# Patient Record
Sex: FEMALE | Race: WHITE | Employment: OTHER | URBAN - METROPOLITAN AREA
[De-identification: names, ages, dates, MRNs, and addresses within clinical notes are randomized per-mention and may not be internally consistent; named-entity substitution may affect disease eponyms.]

---

## 2024-08-12 ENCOUNTER — APPOINTMENT (OUTPATIENT)
Facility: HOSPITAL | Age: 74
DRG: 291 | End: 2024-08-12
Attending: FAMILY MEDICINE
Payer: OTHER GOVERNMENT

## 2024-08-12 ENCOUNTER — APPOINTMENT (OUTPATIENT)
Facility: HOSPITAL | Age: 74
End: 2024-08-12
Payer: OTHER GOVERNMENT

## 2024-08-12 ENCOUNTER — APPOINTMENT (OUTPATIENT)
Facility: HOSPITAL | Age: 74
DRG: 291 | End: 2024-08-12
Payer: OTHER GOVERNMENT

## 2024-08-12 ENCOUNTER — HOSPITAL ENCOUNTER (EMERGENCY)
Facility: HOSPITAL | Age: 74
Discharge: ANOTHER HOSPITAL | End: 2024-08-12
Attending: EMERGENCY MEDICINE
Payer: OTHER GOVERNMENT

## 2024-08-12 ENCOUNTER — HOSPITAL ENCOUNTER (INPATIENT)
Facility: HOSPITAL | Age: 74
LOS: 7 days | Discharge: NURSING, REHAB OR LONG-TERM CARE | DRG: 291 | End: 2024-08-19
Attending: STUDENT IN AN ORGANIZED HEALTH CARE EDUCATION/TRAINING PROGRAM | Admitting: FAMILY MEDICINE
Payer: OTHER GOVERNMENT

## 2024-08-12 VITALS
SYSTOLIC BLOOD PRESSURE: 135 MMHG | HEIGHT: 61 IN | RESPIRATION RATE: 18 BRPM | DIASTOLIC BLOOD PRESSURE: 69 MMHG | OXYGEN SATURATION: 97 % | WEIGHT: 231.1 LBS | TEMPERATURE: 97.6 F | HEART RATE: 70 BPM | BODY MASS INDEX: 43.63 KG/M2

## 2024-08-12 DIAGNOSIS — R07.9 CHEST PAIN, UNSPECIFIED TYPE: ICD-10-CM

## 2024-08-12 DIAGNOSIS — I50.9 ACUTE ON CHRONIC CONGESTIVE HEART FAILURE, UNSPECIFIED HEART FAILURE TYPE (HCC): Primary | ICD-10-CM

## 2024-08-12 DIAGNOSIS — R09.02 HYPOXEMIA: ICD-10-CM

## 2024-08-12 DIAGNOSIS — R07.9 CHEST PAIN, UNSPECIFIED TYPE: Primary | ICD-10-CM

## 2024-08-12 PROBLEM — I50.43 CHF (CONGESTIVE HEART FAILURE), NYHA CLASS I, ACUTE ON CHRONIC, COMBINED (HCC): Status: ACTIVE | Noted: 2024-08-12

## 2024-08-12 LAB
ALBUMIN SERPL-MCNC: 1.8 G/DL (ref 3.5–5)
ALBUMIN/GLOB SERPL: 0.5 (ref 1.1–2.2)
ALP SERPL-CCNC: 150 U/L (ref 45–117)
ALT SERPL-CCNC: 16 U/L (ref 12–78)
ANION GAP SERPL CALC-SCNC: 5 MMOL/L (ref 5–15)
AST SERPL W P-5'-P-CCNC: 32 U/L (ref 15–37)
BASOPHILS # BLD: 0.1 K/UL (ref 0–0.1)
BASOPHILS NFR BLD: 1 % (ref 0–1)
BILIRUB SERPL-MCNC: 0.6 MG/DL (ref 0.2–1)
BNP SERPL-MCNC: 2395 PG/ML
BUN SERPL-MCNC: 22 MG/DL (ref 6–20)
BUN/CREAT SERPL: 22 (ref 12–20)
CA-I BLD-MCNC: 9.4 MG/DL (ref 8.5–10.1)
CHLORIDE SERPL-SCNC: 101 MMOL/L (ref 97–108)
CO2 SERPL-SCNC: 32 MMOL/L (ref 21–32)
CREAT SERPL-MCNC: 0.98 MG/DL (ref 0.55–1.02)
DIFFERENTIAL METHOD BLD: ABNORMAL
EKG ATRIAL RATE: 0 BPM
EKG ATRIAL RATE: 62 BPM
EKG DIAGNOSIS: NORMAL
EKG DIAGNOSIS: NORMAL
EKG P-R INTERVAL: 187 MS
EKG Q-T INTERVAL: 425 MS
EKG Q-T INTERVAL: 464 MS
EKG QRS DURATION: 148 MS
EKG QRS DURATION: 150 MS
EKG QTC CALCULATION (BAZETT): 459 MS
EKG QTC CALCULATION (BAZETT): 501 MS
EKG R AXIS: 113 DEGREES
EKG R AXIS: 35 DEGREES
EKG T AXIS: 20 DEGREES
EKG VENTRICULAR RATE: 70 BPM
EKG VENTRICULAR RATE: 70 BPM
EOSINOPHIL # BLD: 0.3 K/UL (ref 0–0.4)
EOSINOPHIL NFR BLD: 3 % (ref 0–7)
ERYTHROCYTE [DISTWIDTH] IN BLOOD BY AUTOMATED COUNT: 15.6 % (ref 11.5–14.5)
EST. AVERAGE GLUCOSE BLD GHB EST-MCNC: 209 MG/DL
GLOBULIN SER CALC-MCNC: 4 G/DL (ref 2–4)
GLUCOSE BLD STRIP.AUTO-MCNC: 175 MG/DL (ref 65–100)
GLUCOSE BLD STRIP.AUTO-MCNC: 216 MG/DL (ref 65–100)
GLUCOSE BLD STRIP.AUTO-MCNC: 284 MG/DL (ref 65–100)
GLUCOSE SERPL-MCNC: 198 MG/DL (ref 65–100)
HBA1C MFR BLD: 8.9 % (ref 4–5.6)
HCT VFR BLD AUTO: 40.5 % (ref 35–47)
HGB BLD-MCNC: 12.8 G/DL (ref 11.5–16)
IMM GRANULOCYTES # BLD AUTO: 0.2 K/UL (ref 0–0.04)
IMM GRANULOCYTES NFR BLD AUTO: 2 % (ref 0–0.5)
INR PPP: 1.2 (ref 0.9–1.1)
LYMPHOCYTES # BLD: 0.9 K/UL (ref 0.8–3.5)
LYMPHOCYTES NFR BLD: 8 % (ref 12–49)
MCH RBC QN AUTO: 30.6 PG (ref 26–34)
MCHC RBC AUTO-ENTMCNC: 31.6 G/DL (ref 30–36.5)
MCV RBC AUTO: 96.9 FL (ref 80–99)
MONOCYTES # BLD: 0.8 K/UL (ref 0–1)
MONOCYTES NFR BLD: 7 % (ref 5–13)
NEUTS SEG # BLD: 9.3 K/UL (ref 1.8–8)
NEUTS SEG NFR BLD: 79 % (ref 32–75)
NRBC # BLD: 0 K/UL (ref 0–0.01)
NRBC BLD-RTO: 0 PER 100 WBC
PERFORMED BY:: ABNORMAL
PLATELET # BLD AUTO: 289 K/UL (ref 150–400)
PMV BLD AUTO: 8.8 FL (ref 8.9–12.9)
POTASSIUM SERPL-SCNC: 4.7 MMOL/L (ref 3.5–5.1)
PROT SERPL-MCNC: 5.8 G/DL (ref 6.4–8.2)
PROTHROMBIN TIME: 15.8 SEC (ref 11.9–14.6)
RBC # BLD AUTO: 4.18 M/UL (ref 3.8–5.2)
RBC MORPH BLD: ABNORMAL
SODIUM SERPL-SCNC: 138 MMOL/L (ref 136–145)
TROPONIN I SERPL HS-MCNC: 5 NG/L (ref 0–51)
TROPONIN I SERPL HS-MCNC: 6 NG/L (ref 0–51)
TROPONIN I SERPL HS-MCNC: 6 NG/L (ref 0–51)
WBC # BLD AUTO: 11.6 K/UL (ref 3.6–11)

## 2024-08-12 PROCEDURE — 82962 GLUCOSE BLOOD TEST: CPT

## 2024-08-12 PROCEDURE — 2700000000 HC OXYGEN THERAPY PER DAY

## 2024-08-12 PROCEDURE — 93005 ELECTROCARDIOGRAM TRACING: CPT | Performed by: STUDENT IN AN ORGANIZED HEALTH CARE EDUCATION/TRAINING PROGRAM

## 2024-08-12 PROCEDURE — 71045 X-RAY EXAM CHEST 1 VIEW: CPT

## 2024-08-12 PROCEDURE — 80053 COMPREHEN METABOLIC PANEL: CPT

## 2024-08-12 PROCEDURE — 73610 X-RAY EXAM OF ANKLE: CPT

## 2024-08-12 PROCEDURE — 2060000000 HC ICU INTERMEDIATE R&B

## 2024-08-12 PROCEDURE — 6370000000 HC RX 637 (ALT 250 FOR IP): Performed by: EMERGENCY MEDICINE

## 2024-08-12 PROCEDURE — 2580000003 HC RX 258: Performed by: FAMILY MEDICINE

## 2024-08-12 PROCEDURE — 6360000002 HC RX W HCPCS: Performed by: EMERGENCY MEDICINE

## 2024-08-12 PROCEDURE — 6370000000 HC RX 637 (ALT 250 FOR IP): Performed by: FAMILY MEDICINE

## 2024-08-12 PROCEDURE — 6360000004 HC RX CONTRAST MEDICATION

## 2024-08-12 PROCEDURE — 84484 ASSAY OF TROPONIN QUANT: CPT

## 2024-08-12 PROCEDURE — 85610 PROTHROMBIN TIME: CPT

## 2024-08-12 PROCEDURE — 96374 THER/PROPH/DIAG INJ IV PUSH: CPT

## 2024-08-12 PROCEDURE — 36415 COLL VENOUS BLD VENIPUNCTURE: CPT

## 2024-08-12 PROCEDURE — 83036 HEMOGLOBIN GLYCOSYLATED A1C: CPT

## 2024-08-12 PROCEDURE — 71275 CT ANGIOGRAPHY CHEST: CPT

## 2024-08-12 PROCEDURE — 96375 TX/PRO/DX INJ NEW DRUG ADDON: CPT

## 2024-08-12 PROCEDURE — 36410 VNPNXR 3YR/> PHY/QHP DX/THER: CPT

## 2024-08-12 PROCEDURE — 99285 EMERGENCY DEPT VISIT HI MDM: CPT

## 2024-08-12 PROCEDURE — C8929 TTE W OR WO FOL WCON,DOPPLER: HCPCS

## 2024-08-12 PROCEDURE — 6360000002 HC RX W HCPCS: Performed by: FAMILY MEDICINE

## 2024-08-12 PROCEDURE — 85025 COMPLETE CBC W/AUTO DIFF WBC: CPT

## 2024-08-12 PROCEDURE — 93005 ELECTROCARDIOGRAM TRACING: CPT | Performed by: EMERGENCY MEDICINE

## 2024-08-12 PROCEDURE — 6370000000 HC RX 637 (ALT 250 FOR IP)

## 2024-08-12 PROCEDURE — 6360000004 HC RX CONTRAST MEDICATION: Performed by: STUDENT IN AN ORGANIZED HEALTH CARE EDUCATION/TRAINING PROGRAM

## 2024-08-12 PROCEDURE — 94761 N-INVAS EAR/PLS OXIMETRY MLT: CPT

## 2024-08-12 PROCEDURE — 83880 ASSAY OF NATRIURETIC PEPTIDE: CPT

## 2024-08-12 PROCEDURE — 05HD33Z INSERTION OF INFUSION DEVICE INTO RIGHT CEPHALIC VEIN, PERCUTANEOUS APPROACH: ICD-10-PCS | Performed by: FAMILY MEDICINE

## 2024-08-12 RX ORDER — EXENATIDE 2 MG/.85ML
2 INJECTION, SUSPENSION, EXTENDED RELEASE SUBCUTANEOUS
Status: ON HOLD | COMMUNITY
End: 2024-08-19 | Stop reason: HOSPADM

## 2024-08-12 RX ORDER — ACETAMINOPHEN 160 MG
1 TABLET,DISINTEGRATING ORAL DAILY
Status: ON HOLD | COMMUNITY
End: 2024-08-19 | Stop reason: HOSPADM

## 2024-08-12 RX ORDER — BUMETANIDE 0.25 MG/ML
1 INJECTION INTRAMUSCULAR; INTRAVENOUS DAILY
Status: DISCONTINUED | OUTPATIENT
Start: 2024-08-12 | End: 2024-08-14

## 2024-08-12 RX ORDER — POTASSIUM CHLORIDE 750 MG/1
10 TABLET, FILM COATED, EXTENDED RELEASE ORAL DAILY
Status: DISCONTINUED | OUTPATIENT
Start: 2024-08-12 | End: 2024-08-19 | Stop reason: HOSPADM

## 2024-08-12 RX ORDER — INSULIN LISPRO 100 [IU]/ML
0-16 INJECTION, SOLUTION INTRAVENOUS; SUBCUTANEOUS
Status: DISCONTINUED | OUTPATIENT
Start: 2024-08-12 | End: 2024-08-19 | Stop reason: HOSPADM

## 2024-08-12 RX ORDER — WARFARIN SODIUM 2 MG/1
2 TABLET ORAL
Status: ON HOLD | COMMUNITY
End: 2024-08-19 | Stop reason: HOSPADM

## 2024-08-12 RX ORDER — EZETIMIBE 10 MG/1
10 TABLET ORAL NIGHTLY
Status: DISCONTINUED | OUTPATIENT
Start: 2024-08-12 | End: 2024-08-19 | Stop reason: HOSPADM

## 2024-08-12 RX ORDER — SODIUM CHLORIDE 9 MG/ML
INJECTION, SOLUTION INTRAVENOUS PRN
Status: DISCONTINUED | OUTPATIENT
Start: 2024-08-12 | End: 2024-08-19 | Stop reason: HOSPADM

## 2024-08-12 RX ORDER — ATORVASTATIN CALCIUM 80 MG/1
80 TABLET, FILM COATED ORAL NIGHTLY
COMMUNITY

## 2024-08-12 RX ORDER — TRAMADOL HYDROCHLORIDE 50 MG/1
50 TABLET ORAL EVERY 6 HOURS PRN
Status: DISCONTINUED | OUTPATIENT
Start: 2024-08-12 | End: 2024-08-19 | Stop reason: HOSPADM

## 2024-08-12 RX ORDER — POTASSIUM CHLORIDE 20 MEQ/1
40 TABLET, EXTENDED RELEASE ORAL PRN
Status: DISCONTINUED | OUTPATIENT
Start: 2024-08-12 | End: 2024-08-19 | Stop reason: HOSPADM

## 2024-08-12 RX ORDER — ONDANSETRON 2 MG/ML
4 INJECTION INTRAMUSCULAR; INTRAVENOUS EVERY 6 HOURS PRN
Status: DISCONTINUED | OUTPATIENT
Start: 2024-08-12 | End: 2024-08-19 | Stop reason: HOSPADM

## 2024-08-12 RX ORDER — DIGOXIN 125 MCG
125 TABLET ORAL DAILY
Status: DISCONTINUED | OUTPATIENT
Start: 2024-08-12 | End: 2024-08-19 | Stop reason: HOSPADM

## 2024-08-12 RX ORDER — INSULIN LISPRO 100 [IU]/ML
24 INJECTION, SOLUTION INTRAVENOUS; SUBCUTANEOUS
COMMUNITY

## 2024-08-12 RX ORDER — INSULIN GLARGINE 100 [IU]/ML
30 INJECTION, SOLUTION SUBCUTANEOUS EVERY MORNING
COMMUNITY

## 2024-08-12 RX ORDER — MAGNESIUM HYDROXIDE/ALUMINUM HYDROXICE/SIMETHICONE 120; 1200; 1200 MG/30ML; MG/30ML; MG/30ML
30 SUSPENSION ORAL EVERY 6 HOURS PRN
Status: DISCONTINUED | OUTPATIENT
Start: 2024-08-12 | End: 2024-08-12 | Stop reason: HOSPADM

## 2024-08-12 RX ORDER — LETROZOLE 2.5 MG/1
2.5 TABLET, FILM COATED ORAL DAILY
Status: DISCONTINUED | OUTPATIENT
Start: 2024-08-12 | End: 2024-08-19 | Stop reason: HOSPADM

## 2024-08-12 RX ORDER — METOPROLOL TARTRATE 50 MG/1
50 TABLET, FILM COATED ORAL 2 TIMES DAILY
COMMUNITY

## 2024-08-12 RX ORDER — DEXTROSE MONOHYDRATE 100 MG/ML
INJECTION, SOLUTION INTRAVENOUS CONTINUOUS PRN
Status: DISCONTINUED | OUTPATIENT
Start: 2024-08-12 | End: 2024-08-19 | Stop reason: HOSPADM

## 2024-08-12 RX ORDER — MAGNESIUM SULFATE IN WATER 40 MG/ML
2000 INJECTION, SOLUTION INTRAVENOUS PRN
Status: DISCONTINUED | OUTPATIENT
Start: 2024-08-12 | End: 2024-08-19 | Stop reason: HOSPADM

## 2024-08-12 RX ORDER — DOCUSATE SODIUM 100 MG/1
100 CAPSULE, LIQUID FILLED ORAL DAILY
Status: ON HOLD | COMMUNITY
End: 2024-08-19 | Stop reason: HOSPADM

## 2024-08-12 RX ORDER — GLUCAGON 1 MG/ML
1 KIT INJECTION PRN
Status: DISCONTINUED | OUTPATIENT
Start: 2024-08-12 | End: 2024-08-19 | Stop reason: HOSPADM

## 2024-08-12 RX ORDER — ALLOPURINOL 200 MG/1
200 TABLET ORAL DAILY
Status: ON HOLD | COMMUNITY
End: 2024-08-19 | Stop reason: HOSPADM

## 2024-08-12 RX ORDER — SODIUM CHLORIDE 0.9 % (FLUSH) 0.9 %
5-40 SYRINGE (ML) INJECTION PRN
Status: DISCONTINUED | OUTPATIENT
Start: 2024-08-12 | End: 2024-08-19 | Stop reason: HOSPADM

## 2024-08-12 RX ORDER — MORPHINE SULFATE 4 MG/ML
4 INJECTION, SOLUTION INTRAMUSCULAR; INTRAVENOUS
Status: COMPLETED | OUTPATIENT
Start: 2024-08-12 | End: 2024-08-12

## 2024-08-12 RX ORDER — SENNOSIDES A AND B 8.6 MG/1
2 TABLET, FILM COATED ORAL
Status: ON HOLD | COMMUNITY
End: 2024-08-19 | Stop reason: HOSPADM

## 2024-08-12 RX ORDER — LETROZOLE 2.5 MG/1
2.5 TABLET, FILM COATED ORAL DAILY
COMMUNITY

## 2024-08-12 RX ORDER — SEMAGLUTIDE 2.68 MG/ML
2 INJECTION, SOLUTION SUBCUTANEOUS
COMMUNITY

## 2024-08-12 RX ORDER — EZETIMIBE 10 MG/1
5 TABLET ORAL NIGHTLY
COMMUNITY

## 2024-08-12 RX ORDER — ACETAMINOPHEN 500 MG
1000 TABLET ORAL EVERY 8 HOURS SCHEDULED
Status: DISCONTINUED | OUTPATIENT
Start: 2024-08-12 | End: 2024-08-19 | Stop reason: HOSPADM

## 2024-08-12 RX ORDER — LIDOCAINE HYDROCHLORIDE 20 MG/ML
15 SOLUTION OROPHARYNGEAL
Status: DISCONTINUED | OUTPATIENT
Start: 2024-08-12 | End: 2024-08-12 | Stop reason: HOSPADM

## 2024-08-12 RX ORDER — WARFARIN SODIUM 3 MG/1
3 TABLET ORAL
Status: ON HOLD | COMMUNITY
End: 2024-08-19 | Stop reason: HOSPADM

## 2024-08-12 RX ORDER — ENOXAPARIN SODIUM 100 MG/ML
0.4 INJECTION SUBCUTANEOUS 2 TIMES DAILY
Status: ON HOLD | COMMUNITY
End: 2024-08-19 | Stop reason: HOSPADM

## 2024-08-12 RX ORDER — TRAMADOL HYDROCHLORIDE 50 MG/1
50 TABLET ORAL EVERY 6 HOURS PRN
COMMUNITY

## 2024-08-12 RX ORDER — BENZOCAINE/MENTHOL 6 MG-10 MG
LOZENGE MUCOUS MEMBRANE EVERY MORNING
COMMUNITY

## 2024-08-12 RX ORDER — LATANOPROST 50 UG/ML
1 SOLUTION/ DROPS OPHTHALMIC NIGHTLY
COMMUNITY

## 2024-08-12 RX ORDER — ACETAMINOPHEN 500 MG
1000 TABLET ORAL EVERY 8 HOURS PRN
COMMUNITY

## 2024-08-12 RX ORDER — POTASSIUM CHLORIDE 7.45 MG/ML
10 INJECTION INTRAVENOUS PRN
Status: DISCONTINUED | OUTPATIENT
Start: 2024-08-12 | End: 2024-08-19 | Stop reason: HOSPADM

## 2024-08-12 RX ORDER — SODIUM CHLORIDE 0.9 % (FLUSH) 0.9 %
5-40 SYRINGE (ML) INJECTION EVERY 12 HOURS SCHEDULED
Status: DISCONTINUED | OUTPATIENT
Start: 2024-08-12 | End: 2024-08-19 | Stop reason: HOSPADM

## 2024-08-12 RX ORDER — ATORVASTATIN CALCIUM 40 MG/1
40 TABLET, FILM COATED ORAL NIGHTLY
Status: DISCONTINUED | OUTPATIENT
Start: 2024-08-12 | End: 2024-08-19 | Stop reason: HOSPADM

## 2024-08-12 RX ORDER — ONDANSETRON 4 MG/1
4 TABLET, ORALLY DISINTEGRATING ORAL EVERY 8 HOURS PRN
Status: DISCONTINUED | OUTPATIENT
Start: 2024-08-12 | End: 2024-08-19 | Stop reason: HOSPADM

## 2024-08-12 RX ORDER — DILTIAZEM HYDROCHLORIDE 300 MG/1
300 CAPSULE, COATED, EXTENDED RELEASE ORAL DAILY
Status: DISCONTINUED | OUTPATIENT
Start: 2024-08-12 | End: 2024-08-19 | Stop reason: HOSPADM

## 2024-08-12 RX ORDER — ACETAMINOPHEN 325 MG/1
650 TABLET ORAL EVERY 6 HOURS PRN
Status: DISCONTINUED | OUTPATIENT
Start: 2024-08-12 | End: 2024-08-12

## 2024-08-12 RX ORDER — CALCIUM POLYCARBOPHIL 625 MG
625 TABLET ORAL
Status: ON HOLD | COMMUNITY
End: 2024-08-19 | Stop reason: HOSPADM

## 2024-08-12 RX ORDER — POLYETHYLENE GLYCOL 3350 17 G/17G
17 POWDER, FOR SOLUTION ORAL DAILY PRN
Status: DISCONTINUED | OUTPATIENT
Start: 2024-08-12 | End: 2024-08-19 | Stop reason: HOSPADM

## 2024-08-12 RX ORDER — ACETAMINOPHEN 650 MG/1
650 SUPPOSITORY RECTAL EVERY 6 HOURS PRN
Status: DISCONTINUED | OUTPATIENT
Start: 2024-08-12 | End: 2024-08-12

## 2024-08-12 RX ORDER — FUROSEMIDE 10 MG/ML
40 INJECTION INTRAMUSCULAR; INTRAVENOUS
Status: COMPLETED | OUTPATIENT
Start: 2024-08-12 | End: 2024-08-12

## 2024-08-12 RX ORDER — DILTIAZEM HYDROCHLORIDE EXTENDED-RELEASE TABLETS 300 MG/1
1 TABLET, EXTENDED RELEASE ORAL DAILY
COMMUNITY

## 2024-08-12 RX ORDER — DIGOXIN 125 MCG
300 TABLET ORAL DAILY
COMMUNITY

## 2024-08-12 RX ORDER — INSULIN LISPRO 100 [IU]/ML
0-4 INJECTION, SOLUTION INTRAVENOUS; SUBCUTANEOUS NIGHTLY
Status: DISCONTINUED | OUTPATIENT
Start: 2024-08-12 | End: 2024-08-19 | Stop reason: HOSPADM

## 2024-08-12 RX ORDER — CLOBETASOL PROPIONATE 0.5 MG/G
CREAM TOPICAL
Status: ON HOLD | COMMUNITY
End: 2024-08-19 | Stop reason: HOSPADM

## 2024-08-12 RX ORDER — POLYETHYLENE GLYCOL 3350 17 G/17G
17 POWDER, FOR SOLUTION ORAL 2 TIMES DAILY
COMMUNITY

## 2024-08-12 RX ADMIN — LETROZOLE 2.5 MG: 2.5 TABLET ORAL at 12:57

## 2024-08-12 RX ADMIN — ATORVASTATIN CALCIUM 40 MG: 40 TABLET, FILM COATED ORAL at 22:42

## 2024-08-12 RX ADMIN — POTASSIUM CHLORIDE 10 MEQ: 750 TABLET, EXTENDED RELEASE ORAL at 11:59

## 2024-08-12 RX ADMIN — METOPROLOL TARTRATE 50 MG: 50 TABLET, FILM COATED ORAL at 11:59

## 2024-08-12 RX ADMIN — PERFLUTREN 1 ML: 6.52 INJECTION, SUSPENSION INTRAVENOUS at 17:56

## 2024-08-12 RX ADMIN — IOPAMIDOL 100 ML: 755 INJECTION, SOLUTION INTRAVENOUS at 09:51

## 2024-08-12 RX ADMIN — SODIUM CHLORIDE, PRESERVATIVE FREE 10 ML: 5 INJECTION INTRAVENOUS at 12:58

## 2024-08-12 RX ADMIN — EZETIMIBE 10 MG: 10 TABLET ORAL at 22:43

## 2024-08-12 RX ADMIN — INSULIN HUMAN 24 UNITS: 100 INJECTION, SUSPENSION SUBCUTANEOUS at 12:57

## 2024-08-12 RX ADMIN — DILTIAZEM HYDROCHLORIDE 300 MG: 300 CAPSULE, COATED, EXTENDED RELEASE ORAL at 11:59

## 2024-08-12 RX ADMIN — SODIUM CHLORIDE, PRESERVATIVE FREE 10 ML: 5 INJECTION INTRAVENOUS at 22:43

## 2024-08-12 RX ADMIN — WARFARIN SODIUM 3 MG: 2 TABLET ORAL at 18:21

## 2024-08-12 RX ADMIN — ACETAMINOPHEN 1000 MG: 500 TABLET ORAL at 15:38

## 2024-08-12 RX ADMIN — METOPROLOL TARTRATE 50 MG: 50 TABLET, FILM COATED ORAL at 22:42

## 2024-08-12 RX ADMIN — DIGOXIN 125 MCG: 0.25 TABLET ORAL at 12:57

## 2024-08-12 RX ADMIN — ACETAMINOPHEN 1000 MG: 500 TABLET ORAL at 22:42

## 2024-08-12 RX ADMIN — INSULIN LISPRO 4 UNITS: 100 INJECTION, SOLUTION INTRAVENOUS; SUBCUTANEOUS at 18:21

## 2024-08-12 RX ADMIN — FUROSEMIDE 40 MG: 10 INJECTION, SOLUTION INTRAMUSCULAR; INTRAVENOUS at 06:29

## 2024-08-12 RX ADMIN — MORPHINE SULFATE 4 MG: 4 INJECTION, SOLUTION INTRAMUSCULAR; INTRAVENOUS at 06:29

## 2024-08-12 ASSESSMENT — PAIN - FUNCTIONAL ASSESSMENT
PAIN_FUNCTIONAL_ASSESSMENT: 0-10
PAIN_FUNCTIONAL_ASSESSMENT: 0-10

## 2024-08-12 ASSESSMENT — PAIN SCALES - GENERAL
PAINLEVEL_OUTOF10: 10
PAINLEVEL_OUTOF10: 9
PAINLEVEL_OUTOF10: 10
PAINLEVEL_OUTOF10: 10

## 2024-08-12 ASSESSMENT — ENCOUNTER SYMPTOMS
GASTROINTESTINAL NEGATIVE: 1
SHORTNESS OF BREATH: 1

## 2024-08-12 ASSESSMENT — PAIN DESCRIPTION - LOCATION
LOCATION: CHEST

## 2024-08-12 ASSESSMENT — LIFESTYLE VARIABLES
HOW OFTEN DO YOU HAVE A DRINK CONTAINING ALCOHOL: NEVER
HOW MANY STANDARD DRINKS CONTAINING ALCOHOL DO YOU HAVE ON A TYPICAL DAY: PATIENT DOES NOT DRINK

## 2024-08-12 NOTE — ED PROVIDER NOTES
[facility] EMERGENCY DEPT  EMERGENCY DEPARTMENT HISTORY AND PHYSICAL EXAM      Date: 8/12/2024  Patient Name: Ary Munoz  MRN: [id number]  Birthdate 1950  Date of evaluation: 8/12/2024  Provider: Terrell Lombardi MD   Note Started: 10:41 AM EDT 8/12/24    HISTORY OF PRESENT ILLNESS     Chief Complaint   Patient presents with   • Chest Pain       History Provided By: Patient    HPI: Ary Munoz is a 74 y.o. female with past medical history as reviewed below presents for evaluation of chest pain and dyspnea.  Symptoms present for last 24 hours.  Patient history notable for lower extremity orthopedic surgery.  Patient initially seen at an outside hospital where concern for PE and inability to perform CT scan to transfer here.  Patient received Lasix prior to transfer    PAST MEDICAL HISTORY   Past Medical History:  Past Medical History:   Diagnosis Date   • Acute deep vein thrombosis (DVT) of right lower extremity (HCC)    • Cataract     bilateral   • Cervical radiculopathy    • Chronic diastolic congestive heart failure (HCC)    • Chronic pain syndrome    • Displaced trimalleolar fracture of right lower leg, sequela    • Encounter for other orthopedic aftercare    • Hyperlipidemia, unspecified    • Idiopathic chronic gout, unspecified site, with tophus (tophi)    • Insomnia    • Low back pain    • Night sweats    • Obstructive sleep apnea (adult) (pediatric)    • Other retention of urine    • Pain of left hip joint    • Recurrent UTI    • Sick sinus syndrome (HCC)    • Syncope    • Thyroid nodule    • Tinnitus    • Tubular adenoma of colon    • Type 2 diabetes mellitus with unspecified complications ([facility])    • Unspecified atrial fibrillation (HCC)    • Unspecified atrioventricular block    • 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

## 2024-08-12 NOTE — ED TRIAGE NOTES
Pt started to have chest pain last night but went to sleep hoping it would go away but the pain worsened to 10/10 starts in the center and radiates to the right.    Pt is from [facility] and there for rehab from an ortho surgery. Pt got Lovenox three hours PTA.

## 2024-08-12 NOTE — ED NOTES
[facility] EMERGENCY DEPT  EMERGENCY DEPARTMENT ENCOUNTER      Pt Name: Ary Munoz  MRN: [id number]  Birthdate 1950  Date of evaluation: 8/12/2024  Provider: Gianni Mccartney MD  3:33 AM    CHIEF COMPLAINT       Chief Complaint   Patient presents with    Chest Pain         HISTORY OF PRESENT ILLNESS    Ary Munoz is a 74 y.o. female with history of diabetes, A-fib, CHF and breast cancer, who presents to the emergency department with complaint of chest pain that started tonight.  Pain started substernally but radiates to the right.  She reports shortness of breath.  She is satting at 97%.  She reports continuous O2 supplement this week.    HPI    Nursing Notes were reviewed.    REVIEW OF SYSTEMS       Review of Systems   Constitutional: Negative.    HENT: Negative.     Respiratory:  Positive for shortness of breath.    Cardiovascular:  Positive for chest pain.   Gastrointestinal: Negative.    Neurological: Negative.    Hematological: Negative.        Except as noted above the remainder of the review of systems was reviewed and negative.       PAST MEDICAL HISTORY   No past medical history on file.      SURGICAL HISTORY     No past surgical history on file.      CURRENT MEDICATIONS       Previous Medications    No medications on file       ALLERGIES     Sulfa antibiotics and Amiodarone    FAMILY HISTORY     No family history on file.       SOCIAL HISTORY       Social History     Socioeconomic History    Marital status:        SCREENINGS         Jorge Coma Scale  Eye Opening: Spontaneous  Best Verbal Response: Oriented  Best Motor Response: Obeys commands  Jorge Coma Scale Score: 15                                       PHYSICAL EXAM       ED Triage Vitals   BP Systolic BP Percentile Diastolic BP Percentile Temp Temp src Pulse Resp SpO2   -- -- -- -- -- -- -- --      Height Weight         -- --             Physical Exam  Vitals and nursing note reviewed.   Constitutional:       General: She is not in acute

## 2024-08-12 NOTE — ED NOTES
Pt returned from CT hypoxic, so I increased her oxygen until she returned to the 90s. Pt is currently back on 3L and is at 92%.

## 2024-08-12 NOTE — ED TRIAGE NOTES
Pt is a transfer from [facility] for a CTA. Pt reports chest pain is still a 9/10.

## 2024-08-12 NOTE — ED NOTES
ED TO INPATIENT SBAR HANDOFF    Patient Name: Ary Munoz   Preferred Name: Ary  : 1950  74 y.o.   Family/Caregiver Present: no   Code Status Order: Full Code  PO Status: NPO:No  Telemetry Order:   C-SSRS: Risk of Suicide: No Risk  Sitter no   Restraints:     Sepsis Risk Score      Situation  Chief Complaint   Patient presents with    Chest Pain     Brief Description of Patient's Condition: Patient is a 74 y.o. year old female past medical history of acute deep vein thrombosis of right lower extremity, obstructive sleep apnea, type 2 diabetes, hyperlipidemia, chronic diastolic congestive heart failure, unspecified type of carcinoma in situ of left breast, A-fib, displaced trimalleolar fracture right lower leg, recurrent UTI presents to the ED with complaint of chest pain.  Pain started substernally but radiates to the right.  She reports shortness of breath.   Mental Status: oriented, alert, coherent, logical, thought processes intact, and able to concentrate and follow conversation  Arrived from:Home  Imaging:   CTA CHEST W WO CONTRAST PE Eval   Final Result   No pulmonary embolus. Bilateral pleural effusions, greater on the   left.            Electronically signed by Mayito Parish        Abnormal labs: Abnormal Labs Reviewed - No abnormal labs to display    Background  Allergies:   Allergies   Allergen Reactions    Sulfa Antibiotics Hives    Amiodarone Other (See Comments)     History:   Past Medical History:   Diagnosis Date    Acute deep vein thrombosis (DVT) of right lower extremity (HCC)     Cataract     bilateral    Cervical radiculopathy     Chronic diastolic congestive heart failure (HCC)     Chronic pain syndrome     Displaced trimalleolar fracture of right lower leg, sequela     Encounter for other orthopedic aftercare     Hyperlipidemia, unspecified     Idiopathic chronic gout, unspecified site, with tophus (tophi)     Insomnia     Low back pain     Night sweats     Obstructive sleep apnea

## 2024-08-13 ENCOUNTER — APPOINTMENT (OUTPATIENT)
Facility: HOSPITAL | Age: 74
DRG: 291 | End: 2024-08-13
Payer: OTHER GOVERNMENT

## 2024-08-13 LAB
ALBUMIN SERPL-MCNC: 2.1 G/DL (ref 3.5–5)
ALBUMIN/GLOB SERPL: 0.6 (ref 1.1–2.2)
ALP SERPL-CCNC: 136 U/L (ref 45–117)
ALT SERPL-CCNC: 18 U/L (ref 12–78)
ANION GAP SERPL CALC-SCNC: 5 MMOL/L (ref 5–15)
AST SERPL W P-5'-P-CCNC: 20 U/L (ref 15–37)
BASOPHILS # BLD: 0 K/UL (ref 0–0.1)
BASOPHILS NFR BLD: 0 % (ref 0–1)
BILIRUB SERPL-MCNC: 0.7 MG/DL (ref 0.2–1)
BNP SERPL-MCNC: 2683 PG/ML
BUN SERPL-MCNC: 23 MG/DL (ref 6–20)
BUN/CREAT SERPL: 18 (ref 12–20)
CA-I BLD-MCNC: 9.5 MG/DL (ref 8.5–10.1)
CHLORIDE SERPL-SCNC: 98 MMOL/L (ref 97–108)
CO2 SERPL-SCNC: 34 MMOL/L (ref 21–32)
CREAT SERPL-MCNC: 1.26 MG/DL (ref 0.55–1.02)
DIFFERENTIAL METHOD BLD: ABNORMAL
ECHO AV AREA PEAK VELOCITY: 1.3 CM2
ECHO AV AREA VTI: 1.4 CM2
ECHO AV AREA/BSA PEAK VELOCITY: 0.6 CM2/M2
ECHO AV AREA/BSA VTI: 0.7 CM2/M2
ECHO AV MEAN GRADIENT: 3 MMHG
ECHO AV MEAN VELOCITY: 0.8 M/S
ECHO AV PEAK GRADIENT: 6 MMHG
ECHO AV PEAK VELOCITY: 1.2 M/S
ECHO AV VELOCITY RATIO: 0.67
ECHO AV VTI: 19.4 CM
ECHO BSA: 2.12 M2
ECHO EST RA PRESSURE: 15 MMHG
ECHO LA AREA 4C: 17.5 CM2
ECHO LA MAJOR AXIS: 5.5 CM
ECHO LA VOL MOD A4C: 47 ML (ref 22–52)
ECHO LA VOLUME INDEX MOD A4C: 23 ML/M2 (ref 16–34)
ECHO LV E' LATERAL VELOCITY: 11 CM/S
ECHO LV E' SEPTAL VELOCITY: 8 CM/S
ECHO LVOT AREA: 2 CM2
ECHO LVOT AV VTI INDEX: 0.72
ECHO LVOT DIAM: 1.6 CM
ECHO LVOT MEAN GRADIENT: 1 MMHG
ECHO LVOT PEAK GRADIENT: 3 MMHG
ECHO LVOT PEAK VELOCITY: 0.8 M/S
ECHO LVOT STROKE VOLUME INDEX: 13.9 ML/M2
ECHO LVOT SV: 27.9 ML
ECHO LVOT VTI: 13.9 CM
ECHO MV A VELOCITY: 0.37 M/S
ECHO MV E VELOCITY: 1.26 M/S
ECHO MV E/A RATIO: 3.41
ECHO MV E/E' LATERAL: 11.45
ECHO MV E/E' RATIO (AVERAGED): 13.6
ECHO MV E/E' SEPTAL: 15.75
ECHO PV MAX VELOCITY: 0.7 M/S
ECHO PV MEAN GRADIENT: 1 MMHG
ECHO PV MEAN VELOCITY: 0.5 M/S
ECHO PV PEAK GRADIENT: 2 MMHG
ECHO PV VTI: 11 CM
ECHO RIGHT VENTRICULAR SYSTOLIC PRESSURE (RVSP): 42 MMHG
ECHO RV INTERNAL DIMENSION: 2.7 CM
ECHO TV REGURGITANT MAX VELOCITY: 2.61 M/S
ECHO TV REGURGITANT PEAK GRADIENT: 27 MMHG
EOSINOPHIL # BLD: 0.1 K/UL (ref 0–0.4)
EOSINOPHIL NFR BLD: 1 % (ref 0–7)
ERYTHROCYTE [DISTWIDTH] IN BLOOD BY AUTOMATED COUNT: 15.6 % (ref 11.5–14.5)
GLOBULIN SER CALC-MCNC: 3.7 G/DL (ref 2–4)
GLUCOSE BLD STRIP.AUTO-MCNC: 245 MG/DL (ref 65–100)
GLUCOSE BLD STRIP.AUTO-MCNC: 289 MG/DL (ref 65–100)
GLUCOSE BLD STRIP.AUTO-MCNC: 294 MG/DL (ref 65–100)
GLUCOSE BLD STRIP.AUTO-MCNC: 303 MG/DL (ref 65–100)
GLUCOSE SERPL-MCNC: 244 MG/DL (ref 65–100)
HCT VFR BLD AUTO: 36.4 % (ref 35–47)
HGB BLD-MCNC: 11.9 G/DL (ref 11.5–16)
IMM GRANULOCYTES # BLD AUTO: 0 K/UL
IMM GRANULOCYTES NFR BLD AUTO: 0 %
INR PPP: 1.3 (ref 0.9–1.1)
LYMPHOCYTES # BLD: 1.1 K/UL (ref 0.8–3.5)
LYMPHOCYTES NFR BLD: 10 % (ref 12–49)
MCH RBC QN AUTO: 30.7 PG (ref 26–34)
MCHC RBC AUTO-ENTMCNC: 32.7 G/DL (ref 30–36.5)
MCV RBC AUTO: 93.8 FL (ref 80–99)
MONOCYTES # BLD: 1.2 K/UL (ref 0–1)
MONOCYTES NFR BLD: 11 % (ref 5–13)
NEUTS BAND NFR BLD MANUAL: 3 % (ref 0–6)
NEUTS SEG # BLD: 8.1 K/UL (ref 1.8–8)
NEUTS SEG NFR BLD: 75 % (ref 32–75)
NRBC # BLD: 0 K/UL (ref 0–0.01)
NRBC BLD-RTO: 0 PER 100 WBC
PERFORMED BY:: ABNORMAL
PLATELET # BLD AUTO: 350 K/UL (ref 150–400)
PMV BLD AUTO: 8.8 FL (ref 8.9–12.9)
POTASSIUM SERPL-SCNC: 3.8 MMOL/L (ref 3.5–5.1)
PROT SERPL-MCNC: 5.8 G/DL (ref 6.4–8.2)
PROTHROMBIN TIME: 16.6 SEC (ref 11.9–14.6)
RBC # BLD AUTO: 3.88 M/UL (ref 3.8–5.2)
RBC MORPH BLD: ABNORMAL
SODIUM SERPL-SCNC: 137 MMOL/L (ref 136–145)
WBC # BLD AUTO: 10.5 K/UL (ref 3.6–11)

## 2024-08-13 PROCEDURE — 6370000000 HC RX 637 (ALT 250 FOR IP): Performed by: FAMILY MEDICINE

## 2024-08-13 PROCEDURE — 85025 COMPLETE CBC W/AUTO DIFF WBC: CPT

## 2024-08-13 PROCEDURE — 6360000002 HC RX W HCPCS: Performed by: FAMILY MEDICINE

## 2024-08-13 PROCEDURE — 36415 COLL VENOUS BLD VENIPUNCTURE: CPT

## 2024-08-13 PROCEDURE — 80053 COMPREHEN METABOLIC PANEL: CPT

## 2024-08-13 PROCEDURE — 94761 N-INVAS EAR/PLS OXIMETRY MLT: CPT

## 2024-08-13 PROCEDURE — 6370000000 HC RX 637 (ALT 250 FOR IP)

## 2024-08-13 PROCEDURE — 85610 PROTHROMBIN TIME: CPT

## 2024-08-13 PROCEDURE — 2580000003 HC RX 258: Performed by: FAMILY MEDICINE

## 2024-08-13 PROCEDURE — 73560 X-RAY EXAM OF KNEE 1 OR 2: CPT

## 2024-08-13 PROCEDURE — 83880 ASSAY OF NATRIURETIC PEPTIDE: CPT

## 2024-08-13 PROCEDURE — 2700000000 HC OXYGEN THERAPY PER DAY

## 2024-08-13 PROCEDURE — 82962 GLUCOSE BLOOD TEST: CPT

## 2024-08-13 PROCEDURE — 2060000000 HC ICU INTERMEDIATE R&B

## 2024-08-13 RX ADMIN — INSULIN LISPRO 4 UNITS: 100 INJECTION, SOLUTION INTRAVENOUS; SUBCUTANEOUS at 21:07

## 2024-08-13 RX ADMIN — SODIUM CHLORIDE, PRESERVATIVE FREE 10 ML: 5 INJECTION INTRAVENOUS at 23:21

## 2024-08-13 RX ADMIN — BUMETANIDE 1 MG: 0.25 INJECTION INTRAMUSCULAR; INTRAVENOUS at 08:42

## 2024-08-13 RX ADMIN — METOPROLOL TARTRATE 50 MG: 50 TABLET, FILM COATED ORAL at 08:40

## 2024-08-13 RX ADMIN — INSULIN LISPRO 8 UNITS: 100 INJECTION, SOLUTION INTRAVENOUS; SUBCUTANEOUS at 17:35

## 2024-08-13 RX ADMIN — LETROZOLE 2.5 MG: 2.5 TABLET ORAL at 08:39

## 2024-08-13 RX ADMIN — INSULIN LISPRO 4 UNITS: 100 INJECTION, SOLUTION INTRAVENOUS; SUBCUTANEOUS at 08:42

## 2024-08-13 RX ADMIN — INSULIN HUMAN 24 UNITS: 100 INJECTION, SUSPENSION SUBCUTANEOUS at 08:37

## 2024-08-13 RX ADMIN — ATORVASTATIN CALCIUM 40 MG: 40 TABLET, FILM COATED ORAL at 21:01

## 2024-08-13 RX ADMIN — ACETAMINOPHEN 1000 MG: 500 TABLET ORAL at 14:50

## 2024-08-13 RX ADMIN — DILTIAZEM HYDROCHLORIDE 300 MG: 300 CAPSULE, COATED, EXTENDED RELEASE ORAL at 08:46

## 2024-08-13 RX ADMIN — ACETAMINOPHEN 1000 MG: 500 TABLET ORAL at 23:20

## 2024-08-13 RX ADMIN — WARFARIN SODIUM 3 MG: 2 TABLET ORAL at 17:35

## 2024-08-13 RX ADMIN — EZETIMIBE 10 MG: 10 TABLET ORAL at 21:01

## 2024-08-13 RX ADMIN — DIGOXIN 125 MCG: 0.25 TABLET ORAL at 08:40

## 2024-08-13 RX ADMIN — METOPROLOL TARTRATE 50 MG: 50 TABLET, FILM COATED ORAL at 21:01

## 2024-08-13 RX ADMIN — INSULIN LISPRO 8 UNITS: 100 INJECTION, SOLUTION INTRAVENOUS; SUBCUTANEOUS at 12:40

## 2024-08-13 RX ADMIN — ACETAMINOPHEN 1000 MG: 500 TABLET ORAL at 08:38

## 2024-08-13 RX ADMIN — POTASSIUM CHLORIDE 10 MEQ: 750 TABLET, EXTENDED RELEASE ORAL at 08:39

## 2024-08-13 ASSESSMENT — PAIN SCALES - GENERAL
PAINLEVEL_OUTOF10: 8
PAINLEVEL_OUTOF10: 8

## 2024-08-13 ASSESSMENT — PAIN DESCRIPTION - LOCATION: LOCATION: FOOT;LEG;KNEE

## 2024-08-13 NOTE — CONSULTS
Cardiology Consult    NAME: Ary Munoz   :  1950   MRN:  [id number]     Date/Time:  2024 8:59 AM    Patient PCP: Gilda Foote MD  ________________________________________________________________________    Problem List  -Admitted to the hospital with chest pain and shortness of breath  -History of congestive heart failure with a special device implanted in the heart per patient per history  -[facility]  -Hypertension  -NIDDM  -Dyslipidemia  -Sleep apnea  -History of atrial fibrillation  -Thyroid nodule         Assessment and Plan:   Note dictated 2024  -74-year-old white female admitted to the hospital from [facility] where she was admitted with left leg fracture.  -Patient was admitted to the hospital with chest pain and shortness of breath.  -Patient is a good historian she stated that she is a [facility] patient.  -Patient informed me that she has a special device implanted in the heart for the management of congestive heart failure and the reports goes to [place] and they report it to [facility].  -Will check serial cardiac enzymes obtain an echocardiogram and continue to monitor her on telemetry at this time.  -We will try to obtain the detailed information regarding the device for the management of congestive heart failure from [facility].  -For now I will be conservative as patient is otherwise clinically and hemodynamically stable and continue current conservative medical management.  -Thank you for the consultation and letting me participate in the care of your patient.        []        High complexity decision making was performed        Subjective:   CHIEF COMPLAINT: Chest pain and shortness of breath      REASON FOR CONSULT: Chest pain and shortness of breath      HISTORY OF PRESENT ILLNESS:     Ary Munoz is a 74 y.o. White (non-) female who has no known established coronary artery disease per cardiac catheterization 10 years 
Gilda Foote MD        Or    potassium bicarb-citric acid (EFFER-K) effervescent tablet 40 mEq  40 mEq Oral PRN Gilda Foote MD        Or    potassium chloride 10 mEq/100 mL IVPB (Peripheral Line)  10 mEq IntraVENous PRN Gilda Foote MD        magnesium sulfate 2000 mg in 50 mL IVPB premix  2,000 mg IntraVENous PRN Gilda Foote MD        ondansetron (ZOFRAN-ODT) disintegrating tablet 4 mg  4 mg Oral Q8H PRN Gilda Foote MD        Or    ondansetron (ZOFRAN) injection 4 mg  4 mg IntraVENous Q6H PRN Gilda Foote MD        polyethylene glycol (GLYCOLAX) packet 17 g  17 g Oral Daily PRN Gilda Foote MD        acetaminophen (TYLENOL) tablet 650 mg  650 mg Oral Q6H PRN Gilda Foote MD        Or    acetaminophen (TYLENOL) suppository 650 mg  650 mg Rectal Q6H PRN Gilda Fotoe MD        bumetanide (BUMEX) injection 1 mg  1 mg IntraVENous Daily Gilda Foote MD        metoprolol tartrate (LOPRESSOR) tablet 50 mg  50 mg Oral BID Gilda Foote MD        dilTIAZem (CARDIZEM CD) extended release capsule 300 mg  300 mg Oral Daily Gilda Foote MD        ezetimibe (ZETIA) tablet 10 mg  10 mg Oral Nightly Gilda Foote MD        potassium chloride (KLOR-CON) extended release tablet 10 mEq  10 mEq Oral Daily Gilda Foote MD        atorvastatin (LIPITOR) tablet 40 mg  40 mg Oral Nightly Gilda Foote MD        insulin NPH (HumuLIN N;NovoLIN N) injection vial 24 Units  24 Units SubCUTAneous QA Gilda Foote MD        letrozole (FEMARA) tablet 2.5 mg  2.5 mg Oral Daily Gilda Foote MD         No current outpatient medications on file.      Patient PCP: No primary care provider on file.  PMH:  has a past medical history of Acute deep vein thrombosis (DVT) of right lower extremity (HCC), Cataract, Cervical radiculopathy, Chronic diastolic congestive heart failure (HCC), Chronic pain syndrome, Displaced trimalleolar fracture of right lower leg, sequela, 
Q8 and tramadol every 6 hours as needed for severe pain.  - [facility].    Will sign off on this patient at this time.  Please do not hesitate to reconsult if necessary.    Dr. Aguirre is aware of plan and is in agreement.        Signed By: Dina Arellano PA-C     August 12, 2024

## 2024-08-14 LAB
ALBUMIN SERPL-MCNC: 2.1 G/DL (ref 3.5–5)
ALBUMIN/GLOB SERPL: 0.5 (ref 1.1–2.2)
ALP SERPL-CCNC: 146 U/L (ref 45–117)
ALT SERPL-CCNC: 22 U/L (ref 12–78)
ANION GAP SERPL CALC-SCNC: 4 MMOL/L (ref 5–15)
APPEARANCE UR: ABNORMAL
AST SERPL W P-5'-P-CCNC: 26 U/L (ref 15–37)
BACTERIA URNS QL MICRO: ABNORMAL /HPF
BILIRUB SERPL-MCNC: 0.6 MG/DL (ref 0.2–1)
BILIRUB UR QL: NEGATIVE
BNP SERPL-MCNC: 3173 PG/ML
BUN SERPL-MCNC: 28 MG/DL (ref 6–20)
BUN/CREAT SERPL: 24 (ref 12–20)
CA-I BLD-MCNC: 9.4 MG/DL (ref 8.5–10.1)
CHLORIDE SERPL-SCNC: 99 MMOL/L (ref 97–108)
CO2 SERPL-SCNC: 35 MMOL/L (ref 21–32)
COLOR UR: ABNORMAL
CREAT SERPL-MCNC: 1.16 MG/DL (ref 0.55–1.02)
EPITH CASTS URNS QL MICRO: ABNORMAL /LPF
ERYTHROCYTE [DISTWIDTH] IN BLOOD BY AUTOMATED COUNT: 15.4 % (ref 11.5–14.5)
GLOBULIN SER CALC-MCNC: 4.2 G/DL (ref 2–4)
GLUCOSE BLD STRIP.AUTO-MCNC: 276 MG/DL (ref 65–100)
GLUCOSE BLD STRIP.AUTO-MCNC: 316 MG/DL (ref 65–100)
GLUCOSE BLD STRIP.AUTO-MCNC: 347 MG/DL (ref 65–100)
GLUCOSE BLD STRIP.AUTO-MCNC: 351 MG/DL (ref 65–100)
GLUCOSE SERPL-MCNC: 238 MG/DL (ref 65–100)
GLUCOSE UR STRIP.AUTO-MCNC: >500 MG/DL
HCT VFR BLD AUTO: 40.1 % (ref 35–47)
HGB BLD-MCNC: 12.9 G/DL (ref 11.5–16)
HGB UR QL STRIP: NEGATIVE
INR PPP: 1.4 (ref 0.9–1.1)
KETONES UR QL STRIP.AUTO: NEGATIVE MG/DL
LEUKOCYTE ESTERASE UR QL STRIP.AUTO: ABNORMAL
MCH RBC QN AUTO: 29.9 PG (ref 26–34)
MCHC RBC AUTO-ENTMCNC: 32.2 G/DL (ref 30–36.5)
MCV RBC AUTO: 93 FL (ref 80–99)
MUCOUS THREADS URNS QL MICRO: ABNORMAL /LPF
NITRITE UR QL STRIP.AUTO: NEGATIVE
NRBC # BLD: 0 K/UL (ref 0–0.01)
NRBC BLD-RTO: 0 PER 100 WBC
PERFORMED BY:: ABNORMAL
PH UR STRIP: 5 (ref 5–8)
PLATELET # BLD AUTO: 399 K/UL (ref 150–400)
PMV BLD AUTO: 8.8 FL (ref 8.9–12.9)
POTASSIUM SERPL-SCNC: 3.4 MMOL/L (ref 3.5–5.1)
PROT SERPL-MCNC: 6.3 G/DL (ref 6.4–8.2)
PROT UR STRIP-MCNC: NEGATIVE MG/DL
PROTHROMBIN TIME: 17 SEC (ref 11.9–14.6)
RBC # BLD AUTO: 4.31 M/UL (ref 3.8–5.2)
RBC #/AREA URNS HPF: ABNORMAL /HPF (ref 0–5)
SODIUM SERPL-SCNC: 138 MMOL/L (ref 136–145)
SP GR UR REFRACTOMETRY: 1.02 (ref 1–1.03)
UROBILINOGEN UR QL STRIP.AUTO: 0.1 EU/DL (ref 0.1–1)
WBC # BLD AUTO: 9.2 K/UL (ref 3.6–11)
WBC URNS QL MICRO: >100 /HPF (ref 0–4)

## 2024-08-14 PROCEDURE — 97165 OT EVAL LOW COMPLEX 30 MIN: CPT

## 2024-08-14 PROCEDURE — 2060000000 HC ICU INTERMEDIATE R&B

## 2024-08-14 PROCEDURE — 6370000000 HC RX 637 (ALT 250 FOR IP)

## 2024-08-14 PROCEDURE — 97530 THERAPEUTIC ACTIVITIES: CPT

## 2024-08-14 PROCEDURE — 87086 URINE CULTURE/COLONY COUNT: CPT

## 2024-08-14 PROCEDURE — 94761 N-INVAS EAR/PLS OXIMETRY MLT: CPT

## 2024-08-14 PROCEDURE — 83880 ASSAY OF NATRIURETIC PEPTIDE: CPT

## 2024-08-14 PROCEDURE — 36415 COLL VENOUS BLD VENIPUNCTURE: CPT

## 2024-08-14 PROCEDURE — 2580000003 HC RX 258: Performed by: FAMILY MEDICINE

## 2024-08-14 PROCEDURE — 85610 PROTHROMBIN TIME: CPT

## 2024-08-14 PROCEDURE — 6370000000 HC RX 637 (ALT 250 FOR IP): Performed by: FAMILY MEDICINE

## 2024-08-14 PROCEDURE — 82962 GLUCOSE BLOOD TEST: CPT

## 2024-08-14 PROCEDURE — 80053 COMPREHEN METABOLIC PANEL: CPT

## 2024-08-14 PROCEDURE — 81001 URINALYSIS AUTO W/SCOPE: CPT

## 2024-08-14 PROCEDURE — 97161 PT EVAL LOW COMPLEX 20 MIN: CPT

## 2024-08-14 PROCEDURE — 6360000002 HC RX W HCPCS: Performed by: FAMILY MEDICINE

## 2024-08-14 PROCEDURE — 85027 COMPLETE CBC AUTOMATED: CPT

## 2024-08-14 RX ORDER — BENZOCAINE/MENTHOL 6 MG-10 MG
LOZENGE MUCOUS MEMBRANE 3 TIMES DAILY
Status: DISCONTINUED | OUTPATIENT
Start: 2024-08-14 | End: 2024-08-19 | Stop reason: HOSPADM

## 2024-08-14 RX ORDER — LATANOPROST 50 UG/ML
1 SOLUTION/ DROPS OPHTHALMIC NIGHTLY
Status: DISCONTINUED | OUTPATIENT
Start: 2024-08-14 | End: 2024-08-19 | Stop reason: HOSPADM

## 2024-08-14 RX ORDER — POTASSIUM CHLORIDE 750 MG/1
40 TABLET, FILM COATED, EXTENDED RELEASE ORAL ONCE
Status: COMPLETED | OUTPATIENT
Start: 2024-08-14 | End: 2024-08-14

## 2024-08-14 RX ORDER — DIPHENHYDRAMINE HCL 25 MG
50 CAPSULE ORAL EVERY 6 HOURS PRN
Status: DISCONTINUED | OUTPATIENT
Start: 2024-08-14 | End: 2024-08-19 | Stop reason: HOSPADM

## 2024-08-14 RX ORDER — BUMETANIDE 0.25 MG/ML
1 INJECTION INTRAMUSCULAR; INTRAVENOUS 2 TIMES DAILY
Status: DISCONTINUED | OUTPATIENT
Start: 2024-08-14 | End: 2024-08-18

## 2024-08-14 RX ADMIN — BUMETANIDE 1 MG: 0.25 INJECTION INTRAMUSCULAR; INTRAVENOUS at 09:32

## 2024-08-14 RX ADMIN — WARFARIN SODIUM 3 MG: 2 TABLET ORAL at 17:34

## 2024-08-14 RX ADMIN — EZETIMIBE 10 MG: 10 TABLET ORAL at 20:51

## 2024-08-14 RX ADMIN — LETROZOLE 2.5 MG: 2.5 TABLET ORAL at 09:32

## 2024-08-14 RX ADMIN — INSULIN LISPRO 16 UNITS: 100 INJECTION, SOLUTION INTRAVENOUS; SUBCUTANEOUS at 17:34

## 2024-08-14 RX ADMIN — LATANOPROST 1 DROP: 50 SOLUTION OPHTHALMIC at 20:55

## 2024-08-14 RX ADMIN — POTASSIUM CHLORIDE 40 MEQ: 750 TABLET, EXTENDED RELEASE ORAL at 14:55

## 2024-08-14 RX ADMIN — METOPROLOL TARTRATE 50 MG: 50 TABLET, FILM COATED ORAL at 20:51

## 2024-08-14 RX ADMIN — INSULIN LISPRO 4 UNITS: 100 INJECTION, SOLUTION INTRAVENOUS; SUBCUTANEOUS at 09:31

## 2024-08-14 RX ADMIN — INSULIN LISPRO 4 UNITS: 100 INJECTION, SOLUTION INTRAVENOUS; SUBCUTANEOUS at 20:49

## 2024-08-14 RX ADMIN — POTASSIUM CHLORIDE 10 MEQ: 750 TABLET, EXTENDED RELEASE ORAL at 09:32

## 2024-08-14 RX ADMIN — ATORVASTATIN CALCIUM 40 MG: 40 TABLET, FILM COATED ORAL at 20:51

## 2024-08-14 RX ADMIN — DILTIAZEM HYDROCHLORIDE 300 MG: 300 CAPSULE, COATED, EXTENDED RELEASE ORAL at 09:32

## 2024-08-14 RX ADMIN — SODIUM CHLORIDE, PRESERVATIVE FREE 10 ML: 5 INJECTION INTRAVENOUS at 09:33

## 2024-08-14 RX ADMIN — BUMETANIDE 1 MG: 0.25 INJECTION INTRAMUSCULAR; INTRAVENOUS at 17:34

## 2024-08-14 RX ADMIN — INSULIN LISPRO 16 UNITS: 100 INJECTION, SOLUTION INTRAVENOUS; SUBCUTANEOUS at 12:04

## 2024-08-14 RX ADMIN — DIGOXIN 125 MCG: 0.25 TABLET ORAL at 09:32

## 2024-08-14 RX ADMIN — SODIUM CHLORIDE, PRESERVATIVE FREE 10 ML: 5 INJECTION INTRAVENOUS at 20:58

## 2024-08-14 RX ADMIN — INSULIN HUMAN 24 UNITS: 100 INJECTION, SUSPENSION SUBCUTANEOUS at 09:31

## 2024-08-14 RX ADMIN — ACETAMINOPHEN 1000 MG: 500 TABLET ORAL at 20:54

## 2024-08-14 ASSESSMENT — PAIN SCALES - GENERAL: PAINLEVEL_OUTOF10: 2

## 2024-08-14 ASSESSMENT — PAIN DESCRIPTION - LOCATION: LOCATION: LEG

## 2024-08-15 LAB
ALBUMIN SERPL-MCNC: 2.4 G/DL (ref 3.5–5)
ALBUMIN/GLOB SERPL: 0.7 (ref 1.1–2.2)
ALP SERPL-CCNC: 139 U/L (ref 45–117)
ALT SERPL-CCNC: 20 U/L (ref 12–78)
ANION GAP SERPL CALC-SCNC: 6 MMOL/L (ref 5–15)
AST SERPL W P-5'-P-CCNC: 19 U/L (ref 15–37)
BACTERIA SPEC CULT: NORMAL
BILIRUB SERPL-MCNC: 0.6 MG/DL (ref 0.2–1)
BNP SERPL-MCNC: 2309 PG/ML
BUN SERPL-MCNC: 27 MG/DL (ref 6–20)
BUN/CREAT SERPL: 23 (ref 12–20)
CA-I BLD-MCNC: 9.2 MG/DL (ref 8.5–10.1)
CHLORIDE SERPL-SCNC: 98 MMOL/L (ref 97–108)
CO2 SERPL-SCNC: 33 MMOL/L (ref 21–32)
COLONY COUNT, CNT: NORMAL
CREAT SERPL-MCNC: 1.17 MG/DL (ref 0.55–1.02)
ERYTHROCYTE [DISTWIDTH] IN BLOOD BY AUTOMATED COUNT: 15.3 % (ref 11.5–14.5)
GLOBULIN SER CALC-MCNC: 3.5 G/DL (ref 2–4)
GLUCOSE BLD STRIP.AUTO-MCNC: 253 MG/DL (ref 65–100)
GLUCOSE BLD STRIP.AUTO-MCNC: 258 MG/DL (ref 65–100)
GLUCOSE BLD STRIP.AUTO-MCNC: 304 MG/DL (ref 65–100)
GLUCOSE BLD STRIP.AUTO-MCNC: 350 MG/DL (ref 65–100)
GLUCOSE SERPL-MCNC: 269 MG/DL (ref 65–100)
HCT VFR BLD AUTO: 38.8 % (ref 35–47)
HGB BLD-MCNC: 12.7 G/DL (ref 11.5–16)
INR PPP: 1.3 (ref 0.9–1.1)
Lab: NORMAL
MCH RBC QN AUTO: 30.7 PG (ref 26–34)
MCHC RBC AUTO-ENTMCNC: 32.7 G/DL (ref 30–36.5)
MCV RBC AUTO: 93.7 FL (ref 80–99)
NRBC # BLD: 0 K/UL (ref 0–0.01)
NRBC BLD-RTO: 0 PER 100 WBC
PERFORMED BY:: ABNORMAL
PLATELET # BLD AUTO: 401 K/UL (ref 150–400)
PMV BLD AUTO: 8.7 FL (ref 8.9–12.9)
POTASSIUM SERPL-SCNC: 3.6 MMOL/L (ref 3.5–5.1)
PROT SERPL-MCNC: 5.9 G/DL (ref 6.4–8.2)
PROTHROMBIN TIME: 16.3 SEC (ref 11.9–14.6)
RBC # BLD AUTO: 4.14 M/UL (ref 3.8–5.2)
SODIUM SERPL-SCNC: 137 MMOL/L (ref 136–145)
WBC # BLD AUTO: 9.5 K/UL (ref 3.6–11)

## 2024-08-15 PROCEDURE — 6370000000 HC RX 637 (ALT 250 FOR IP): Performed by: FAMILY MEDICINE

## 2024-08-15 PROCEDURE — 2580000003 HC RX 258: Performed by: FAMILY MEDICINE

## 2024-08-15 PROCEDURE — 6360000002 HC RX W HCPCS: Performed by: FAMILY MEDICINE

## 2024-08-15 PROCEDURE — 36415 COLL VENOUS BLD VENIPUNCTURE: CPT

## 2024-08-15 PROCEDURE — 83880 ASSAY OF NATRIURETIC PEPTIDE: CPT

## 2024-08-15 PROCEDURE — 85610 PROTHROMBIN TIME: CPT

## 2024-08-15 PROCEDURE — 85027 COMPLETE CBC AUTOMATED: CPT

## 2024-08-15 PROCEDURE — 2700000000 HC OXYGEN THERAPY PER DAY

## 2024-08-15 PROCEDURE — 94761 N-INVAS EAR/PLS OXIMETRY MLT: CPT

## 2024-08-15 PROCEDURE — 2060000000 HC ICU INTERMEDIATE R&B

## 2024-08-15 PROCEDURE — 97530 THERAPEUTIC ACTIVITIES: CPT

## 2024-08-15 PROCEDURE — 82962 GLUCOSE BLOOD TEST: CPT

## 2024-08-15 PROCEDURE — 80053 COMPREHEN METABOLIC PANEL: CPT

## 2024-08-15 RX ORDER — WARFARIN SODIUM 5 MG/1
5 TABLET ORAL
Status: COMPLETED | OUTPATIENT
Start: 2024-08-15 | End: 2024-08-15

## 2024-08-15 RX ADMIN — WARFARIN SODIUM 5 MG: 5 TABLET ORAL at 18:22

## 2024-08-15 RX ADMIN — METOPROLOL TARTRATE 50 MG: 50 TABLET, FILM COATED ORAL at 09:11

## 2024-08-15 RX ADMIN — DIGOXIN 125 MCG: 0.25 TABLET ORAL at 09:11

## 2024-08-15 RX ADMIN — INSULIN LISPRO 4 UNITS: 100 INJECTION, SOLUTION INTRAVENOUS; SUBCUTANEOUS at 20:48

## 2024-08-15 RX ADMIN — DILTIAZEM HYDROCHLORIDE 300 MG: 300 CAPSULE, COATED, EXTENDED RELEASE ORAL at 09:12

## 2024-08-15 RX ADMIN — INSULIN LISPRO 16 UNITS: 100 INJECTION, SOLUTION INTRAVENOUS; SUBCUTANEOUS at 13:01

## 2024-08-15 RX ADMIN — INSULIN LISPRO 8 UNITS: 100 INJECTION, SOLUTION INTRAVENOUS; SUBCUTANEOUS at 18:21

## 2024-08-15 RX ADMIN — Medication: at 10:11

## 2024-08-15 RX ADMIN — POTASSIUM CHLORIDE 10 MEQ: 750 TABLET, EXTENDED RELEASE ORAL at 09:12

## 2024-08-15 RX ADMIN — ATORVASTATIN CALCIUM 40 MG: 40 TABLET, FILM COATED ORAL at 20:47

## 2024-08-15 RX ADMIN — Medication: at 14:49

## 2024-08-15 RX ADMIN — INSULIN HUMAN 24 UNITS: 100 INJECTION, SUSPENSION SUBCUTANEOUS at 10:11

## 2024-08-15 RX ADMIN — BUMETANIDE 1 MG: 0.25 INJECTION INTRAMUSCULAR; INTRAVENOUS at 09:11

## 2024-08-15 RX ADMIN — SODIUM CHLORIDE, PRESERVATIVE FREE 10 ML: 5 INJECTION INTRAVENOUS at 09:14

## 2024-08-15 RX ADMIN — Medication: at 20:49

## 2024-08-15 RX ADMIN — BUMETANIDE 1 MG: 0.25 INJECTION INTRAMUSCULAR; INTRAVENOUS at 18:22

## 2024-08-15 RX ADMIN — SODIUM CHLORIDE, PRESERVATIVE FREE 10 ML: 5 INJECTION INTRAVENOUS at 18:22

## 2024-08-15 RX ADMIN — INSULIN LISPRO 8 UNITS: 100 INJECTION, SOLUTION INTRAVENOUS; SUBCUTANEOUS at 09:12

## 2024-08-15 RX ADMIN — EZETIMIBE 10 MG: 10 TABLET ORAL at 20:47

## 2024-08-15 RX ADMIN — METOPROLOL TARTRATE 50 MG: 50 TABLET, FILM COATED ORAL at 20:47

## 2024-08-15 RX ADMIN — LATANOPROST 1 DROP: 50 SOLUTION OPHTHALMIC at 20:49

## 2024-08-15 RX ADMIN — SODIUM CHLORIDE, PRESERVATIVE FREE 10 ML: 5 INJECTION INTRAVENOUS at 20:53

## 2024-08-15 RX ADMIN — LETROZOLE 2.5 MG: 2.5 TABLET ORAL at 09:12

## 2024-08-15 ASSESSMENT — PAIN SCALES - GENERAL
PAINLEVEL_OUTOF10: 0
PAINLEVEL_OUTOF10: 5

## 2024-08-15 ASSESSMENT — PAIN DESCRIPTION - LOCATION: LOCATION: LEG

## 2024-08-15 NOTE — WOUND CARE
Wound Care Note:      Wound Care in to see patient for skin tear on pelvis and red sacrum. Sacrum was blanchable, no open wound. IAD noted to gluteal cleft. Pt is incontinent. Zinc paste applied. Apply zinc paste BID and PRN for soiling. If soiled, remove top layer only and reapply. To remove completely, use foaming cleanser or soap and water.     Small skin tear noted midline under pannus. Wound is pink and dry. Recommend applying zinc paste.           Skin Care & Pressure Relief Recommendations:  Minimize layers of linen  Pads under patient to optimize support surface and microclimate  Turn/Reposition approximately every 2 hours  Pillow/Wedge  Manage Incontinence  Promote Continence; Skin Protective lotion to buttocks and sacrum daily and as needed with incontinence care  Offload heels with pillows or offloading boots            Please reconsult WC for any changes in skin condition.

## 2024-08-16 LAB
ALBUMIN SERPL-MCNC: 2.3 G/DL (ref 3.5–5)
ALBUMIN/GLOB SERPL: 0.6 (ref 1.1–2.2)
ALP SERPL-CCNC: 138 U/L (ref 45–117)
ALT SERPL-CCNC: 21 U/L (ref 12–78)
ANION GAP SERPL CALC-SCNC: 6 MMOL/L (ref 5–15)
AST SERPL W P-5'-P-CCNC: 22 U/L (ref 15–37)
BILIRUB SERPL-MCNC: 0.6 MG/DL (ref 0.2–1)
BNP SERPL-MCNC: 2064 PG/ML
BUN SERPL-MCNC: 28 MG/DL (ref 6–20)
BUN/CREAT SERPL: 24 (ref 12–20)
CA-I BLD-MCNC: 9.1 MG/DL (ref 8.5–10.1)
CHLORIDE SERPL-SCNC: 96 MMOL/L (ref 97–108)
CO2 SERPL-SCNC: 34 MMOL/L (ref 21–32)
CREAT SERPL-MCNC: 1.15 MG/DL (ref 0.55–1.02)
ERYTHROCYTE [DISTWIDTH] IN BLOOD BY AUTOMATED COUNT: 15.6 % (ref 11.5–14.5)
GLOBULIN SER CALC-MCNC: 3.6 G/DL (ref 2–4)
GLUCOSE BLD STRIP.AUTO-MCNC: 281 MG/DL (ref 65–100)
GLUCOSE BLD STRIP.AUTO-MCNC: 343 MG/DL (ref 65–100)
GLUCOSE BLD STRIP.AUTO-MCNC: 350 MG/DL (ref 65–100)
GLUCOSE BLD STRIP.AUTO-MCNC: 436 MG/DL (ref 65–100)
GLUCOSE BLD STRIP.AUTO-MCNC: 440 MG/DL (ref 65–100)
GLUCOSE BLD STRIP.AUTO-MCNC: 447 MG/DL (ref 65–100)
GLUCOSE SERPL-MCNC: 270 MG/DL (ref 65–100)
HCT VFR BLD AUTO: 38.8 % (ref 35–47)
HGB BLD-MCNC: 12.6 G/DL (ref 11.5–16)
INR PPP: 1.3 (ref 0.9–1.1)
MCH RBC QN AUTO: 30.2 PG (ref 26–34)
MCHC RBC AUTO-ENTMCNC: 32.5 G/DL (ref 30–36.5)
MCV RBC AUTO: 93 FL (ref 80–99)
NRBC # BLD: 0 K/UL (ref 0–0.01)
NRBC BLD-RTO: 0 PER 100 WBC
PERFORMED BY:: ABNORMAL
PLATELET # BLD AUTO: 412 K/UL (ref 150–400)
PMV BLD AUTO: 8.9 FL (ref 8.9–12.9)
POTASSIUM SERPL-SCNC: 3.4 MMOL/L (ref 3.5–5.1)
PROT SERPL-MCNC: 5.9 G/DL (ref 6.4–8.2)
PROTHROMBIN TIME: 16.7 SEC (ref 11.9–14.6)
RBC # BLD AUTO: 4.17 M/UL (ref 3.8–5.2)
SODIUM SERPL-SCNC: 136 MMOL/L (ref 136–145)
WBC # BLD AUTO: 11.1 K/UL (ref 3.6–11)

## 2024-08-16 PROCEDURE — 2700000000 HC OXYGEN THERAPY PER DAY

## 2024-08-16 PROCEDURE — 6370000000 HC RX 637 (ALT 250 FOR IP)

## 2024-08-16 PROCEDURE — 94761 N-INVAS EAR/PLS OXIMETRY MLT: CPT

## 2024-08-16 PROCEDURE — 93005 ELECTROCARDIOGRAM TRACING: CPT | Performed by: FAMILY MEDICINE

## 2024-08-16 PROCEDURE — 83880 ASSAY OF NATRIURETIC PEPTIDE: CPT

## 2024-08-16 PROCEDURE — 80053 COMPREHEN METABOLIC PANEL: CPT

## 2024-08-16 PROCEDURE — 6360000002 HC RX W HCPCS: Performed by: FAMILY MEDICINE

## 2024-08-16 PROCEDURE — 85027 COMPLETE CBC AUTOMATED: CPT

## 2024-08-16 PROCEDURE — 82962 GLUCOSE BLOOD TEST: CPT

## 2024-08-16 PROCEDURE — 97530 THERAPEUTIC ACTIVITIES: CPT

## 2024-08-16 PROCEDURE — 85610 PROTHROMBIN TIME: CPT

## 2024-08-16 PROCEDURE — 2060000000 HC ICU INTERMEDIATE R&B

## 2024-08-16 PROCEDURE — 2580000003 HC RX 258: Performed by: FAMILY MEDICINE

## 2024-08-16 PROCEDURE — 6370000000 HC RX 637 (ALT 250 FOR IP): Performed by: FAMILY MEDICINE

## 2024-08-16 PROCEDURE — 36415 COLL VENOUS BLD VENIPUNCTURE: CPT

## 2024-08-16 RX ORDER — INSULIN LISPRO 100 [IU]/ML
16 INJECTION, SOLUTION INTRAVENOUS; SUBCUTANEOUS ONCE
Status: COMPLETED | OUTPATIENT
Start: 2024-08-16 | End: 2024-08-16

## 2024-08-16 RX ORDER — WARFARIN SODIUM 5 MG/1
5 TABLET ORAL
Status: COMPLETED | OUTPATIENT
Start: 2024-08-16 | End: 2024-08-16

## 2024-08-16 RX ADMIN — WARFARIN SODIUM 5 MG: 5 TABLET ORAL at 17:19

## 2024-08-16 RX ADMIN — Medication: at 09:12

## 2024-08-16 RX ADMIN — ATORVASTATIN CALCIUM 40 MG: 40 TABLET, FILM COATED ORAL at 21:07

## 2024-08-16 RX ADMIN — METOPROLOL TARTRATE 50 MG: 50 TABLET, FILM COATED ORAL at 09:10

## 2024-08-16 RX ADMIN — METOPROLOL TARTRATE 50 MG: 50 TABLET, FILM COATED ORAL at 21:06

## 2024-08-16 RX ADMIN — INSULIN LISPRO 16 UNITS: 100 INJECTION, SOLUTION INTRAVENOUS; SUBCUTANEOUS at 21:07

## 2024-08-16 RX ADMIN — SODIUM CHLORIDE, PRESERVATIVE FREE 10 ML: 5 INJECTION INTRAVENOUS at 22:17

## 2024-08-16 RX ADMIN — TRAMADOL HYDROCHLORIDE 50 MG: 50 TABLET ORAL at 21:06

## 2024-08-16 RX ADMIN — CEFTRIAXONE SODIUM 1000 MG: 1 INJECTION, POWDER, FOR SOLUTION INTRAMUSCULAR; INTRAVENOUS at 21:06

## 2024-08-16 RX ADMIN — INSULIN LISPRO 16 UNITS: 100 INJECTION, SOLUTION INTRAVENOUS; SUBCUTANEOUS at 17:19

## 2024-08-16 RX ADMIN — INSULIN HUMAN 10 UNITS: 100 INJECTION, SUSPENSION SUBCUTANEOUS at 23:37

## 2024-08-16 RX ADMIN — DILTIAZEM HYDROCHLORIDE 300 MG: 300 CAPSULE, COATED, EXTENDED RELEASE ORAL at 09:10

## 2024-08-16 RX ADMIN — BUMETANIDE 1 MG: 0.25 INJECTION INTRAMUSCULAR; INTRAVENOUS at 17:20

## 2024-08-16 RX ADMIN — Medication: at 17:21

## 2024-08-16 RX ADMIN — INSULIN LISPRO 8 UNITS: 100 INJECTION, SOLUTION INTRAVENOUS; SUBCUTANEOUS at 09:11

## 2024-08-16 RX ADMIN — INSULIN HUMAN 24 UNITS: 100 INJECTION, SUSPENSION SUBCUTANEOUS at 09:11

## 2024-08-16 RX ADMIN — POTASSIUM CHLORIDE 10 MEQ: 750 TABLET, EXTENDED RELEASE ORAL at 09:10

## 2024-08-16 RX ADMIN — DIGOXIN 125 MCG: 0.25 TABLET ORAL at 09:10

## 2024-08-16 RX ADMIN — SODIUM CHLORIDE, PRESERVATIVE FREE 10 ML: 5 INJECTION INTRAVENOUS at 09:11

## 2024-08-16 RX ADMIN — ACETAMINOPHEN 1000 MG: 500 TABLET ORAL at 21:09

## 2024-08-16 RX ADMIN — EZETIMIBE 10 MG: 10 TABLET ORAL at 21:06

## 2024-08-16 RX ADMIN — INSULIN LISPRO 16 UNITS: 100 INJECTION, SOLUTION INTRAVENOUS; SUBCUTANEOUS at 18:52

## 2024-08-16 RX ADMIN — BUMETANIDE 1 MG: 0.25 INJECTION INTRAMUSCULAR; INTRAVENOUS at 09:11

## 2024-08-16 RX ADMIN — INSULIN LISPRO 12 UNITS: 100 INJECTION, SOLUTION INTRAVENOUS; SUBCUTANEOUS at 12:36

## 2024-08-16 RX ADMIN — LETROZOLE 2.5 MG: 2.5 TABLET ORAL at 09:10

## 2024-08-16 ASSESSMENT — PAIN DESCRIPTION - DIRECTION: RADIATING_TOWARDS: LEFT SHOULDER

## 2024-08-16 ASSESSMENT — PAIN DESCRIPTION - FREQUENCY: FREQUENCY: CONTINUOUS

## 2024-08-16 ASSESSMENT — PAIN DESCRIPTION - ONSET: ONSET: GRADUAL

## 2024-08-16 ASSESSMENT — PAIN - FUNCTIONAL ASSESSMENT: PAIN_FUNCTIONAL_ASSESSMENT: ACTIVITIES ARE NOT PREVENTED

## 2024-08-16 ASSESSMENT — PAIN DESCRIPTION - LOCATION
LOCATION: HEAD;LEG
LOCATION: CHEST

## 2024-08-16 ASSESSMENT — PAIN SCALES - GENERAL
PAINLEVEL_OUTOF10: 7
PAINLEVEL_OUTOF10: 8
PAINLEVEL_OUTOF10: 8
PAINLEVEL_OUTOF10: 0

## 2024-08-16 ASSESSMENT — PAIN DESCRIPTION - DESCRIPTORS
DESCRIPTORS: ACHING
DESCRIPTORS: SHARP

## 2024-08-16 ASSESSMENT — PAIN DESCRIPTION - ORIENTATION: ORIENTATION: LEFT

## 2024-08-16 NOTE — NURSE NAVIGATOR
Heart Failure Nurse Navigator: Heart Failure Education    RN-NN defers education to the nursing staff.    PT transferring to SNF. PT does not meet the criteria for outpt education at this time due to going to SNF.

## 2024-08-17 LAB
ALBUMIN SERPL-MCNC: 2.3 G/DL (ref 3.5–5)
ALBUMIN/GLOB SERPL: 0.7 (ref 1.1–2.2)
ALP SERPL-CCNC: 141 U/L (ref 45–117)
ALT SERPL-CCNC: 21 U/L (ref 12–78)
ANION GAP SERPL CALC-SCNC: 5 MMOL/L (ref 5–15)
AST SERPL W P-5'-P-CCNC: 20 U/L (ref 15–37)
BILIRUB SERPL-MCNC: 0.4 MG/DL (ref 0.2–1)
BUN SERPL-MCNC: 33 MG/DL (ref 6–20)
BUN/CREAT SERPL: 27 (ref 12–20)
CA-I BLD-MCNC: 8.7 MG/DL (ref 8.5–10.1)
CHLORIDE SERPL-SCNC: 95 MMOL/L (ref 97–108)
CO2 SERPL-SCNC: 35 MMOL/L (ref 21–32)
CREAT SERPL-MCNC: 1.24 MG/DL (ref 0.55–1.02)
ERYTHROCYTE [DISTWIDTH] IN BLOOD BY AUTOMATED COUNT: 14.9 % (ref 11.5–14.5)
GLOBULIN SER CALC-MCNC: 3.5 G/DL (ref 2–4)
GLUCOSE BLD STRIP.AUTO-MCNC: 221 MG/DL (ref 65–100)
GLUCOSE BLD STRIP.AUTO-MCNC: 292 MG/DL (ref 65–100)
GLUCOSE BLD STRIP.AUTO-MCNC: 327 MG/DL (ref 65–100)
GLUCOSE BLD STRIP.AUTO-MCNC: 370 MG/DL (ref 65–100)
GLUCOSE BLD STRIP.AUTO-MCNC: 378 MG/DL (ref 65–100)
GLUCOSE SERPL-MCNC: 261 MG/DL (ref 65–100)
HCT VFR BLD AUTO: 40 % (ref 35–47)
HGB BLD-MCNC: 13.2 G/DL (ref 11.5–16)
INR PPP: 1.4 (ref 0.9–1.1)
MCH RBC QN AUTO: 30.3 PG (ref 26–34)
MCHC RBC AUTO-ENTMCNC: 33 G/DL (ref 30–36.5)
MCV RBC AUTO: 92 FL (ref 80–99)
NRBC # BLD: 0 K/UL (ref 0–0.01)
NRBC BLD-RTO: 0 PER 100 WBC
PERFORMED BY:: ABNORMAL
PLATELET # BLD AUTO: 393 K/UL (ref 150–400)
PMV BLD AUTO: 8.7 FL (ref 8.9–12.9)
POTASSIUM SERPL-SCNC: 3.4 MMOL/L (ref 3.5–5.1)
PROT SERPL-MCNC: 5.8 G/DL (ref 6.4–8.2)
PROTHROMBIN TIME: 17.7 SEC (ref 11.9–14.6)
RBC # BLD AUTO: 4.35 M/UL (ref 3.8–5.2)
SODIUM SERPL-SCNC: 135 MMOL/L (ref 136–145)
WBC # BLD AUTO: 10.8 K/UL (ref 3.6–11)

## 2024-08-17 PROCEDURE — 6370000000 HC RX 637 (ALT 250 FOR IP): Performed by: FAMILY MEDICINE

## 2024-08-17 PROCEDURE — 36415 COLL VENOUS BLD VENIPUNCTURE: CPT

## 2024-08-17 PROCEDURE — 6360000002 HC RX W HCPCS: Performed by: FAMILY MEDICINE

## 2024-08-17 PROCEDURE — 85027 COMPLETE CBC AUTOMATED: CPT

## 2024-08-17 PROCEDURE — 80053 COMPREHEN METABOLIC PANEL: CPT

## 2024-08-17 PROCEDURE — 94761 N-INVAS EAR/PLS OXIMETRY MLT: CPT

## 2024-08-17 PROCEDURE — 2580000003 HC RX 258: Performed by: FAMILY MEDICINE

## 2024-08-17 PROCEDURE — 85610 PROTHROMBIN TIME: CPT

## 2024-08-17 PROCEDURE — 1100000000 HC RM PRIVATE

## 2024-08-17 PROCEDURE — 6370000000 HC RX 637 (ALT 250 FOR IP)

## 2024-08-17 PROCEDURE — 82962 GLUCOSE BLOOD TEST: CPT

## 2024-08-17 RX ADMIN — ATORVASTATIN CALCIUM 40 MG: 40 TABLET, FILM COATED ORAL at 20:02

## 2024-08-17 RX ADMIN — INSULIN LISPRO 16 UNITS: 100 INJECTION, SOLUTION INTRAVENOUS; SUBCUTANEOUS at 12:14

## 2024-08-17 RX ADMIN — SODIUM CHLORIDE, PRESERVATIVE FREE 10 ML: 5 INJECTION INTRAVENOUS at 20:05

## 2024-08-17 RX ADMIN — CEFTRIAXONE SODIUM 1000 MG: 1 INJECTION, POWDER, FOR SOLUTION INTRAMUSCULAR; INTRAVENOUS at 21:13

## 2024-08-17 RX ADMIN — BUMETANIDE 1 MG: 0.25 INJECTION INTRAMUSCULAR; INTRAVENOUS at 09:23

## 2024-08-17 RX ADMIN — LATANOPROST 1 DROP: 50 SOLUTION OPHTHALMIC at 20:05

## 2024-08-17 RX ADMIN — HUMAN INSULIN 24 UNITS: 100 INJECTION, SUSPENSION SUBCUTANEOUS at 16:52

## 2024-08-17 RX ADMIN — DILTIAZEM HYDROCHLORIDE 300 MG: 300 CAPSULE, COATED, EXTENDED RELEASE ORAL at 09:23

## 2024-08-17 RX ADMIN — LETROZOLE 2.5 MG: 2.5 TABLET ORAL at 09:23

## 2024-08-17 RX ADMIN — INSULIN HUMAN 24 UNITS: 100 INJECTION, SUSPENSION SUBCUTANEOUS at 09:23

## 2024-08-17 RX ADMIN — METOPROLOL TARTRATE 50 MG: 50 TABLET, FILM COATED ORAL at 20:02

## 2024-08-17 RX ADMIN — INSULIN LISPRO 16 UNITS: 100 INJECTION, SOLUTION INTRAVENOUS; SUBCUTANEOUS at 16:51

## 2024-08-17 RX ADMIN — ACETAMINOPHEN 1000 MG: 500 TABLET ORAL at 21:15

## 2024-08-17 RX ADMIN — POTASSIUM CHLORIDE 10 MEQ: 750 TABLET, EXTENDED RELEASE ORAL at 09:23

## 2024-08-17 RX ADMIN — BUMETANIDE 1 MG: 0.25 INJECTION INTRAMUSCULAR; INTRAVENOUS at 16:51

## 2024-08-17 RX ADMIN — INSULIN LISPRO 4 UNITS: 100 INJECTION, SOLUTION INTRAVENOUS; SUBCUTANEOUS at 23:22

## 2024-08-17 RX ADMIN — METOPROLOL TARTRATE 50 MG: 50 TABLET, FILM COATED ORAL at 09:23

## 2024-08-17 RX ADMIN — DIGOXIN 125 MCG: 0.25 TABLET ORAL at 09:23

## 2024-08-17 RX ADMIN — WARFARIN SODIUM 6 MG: 5 TABLET ORAL at 16:52

## 2024-08-17 RX ADMIN — SODIUM CHLORIDE, PRESERVATIVE FREE 10 ML: 5 INJECTION INTRAVENOUS at 09:27

## 2024-08-17 RX ADMIN — EZETIMIBE 10 MG: 10 TABLET ORAL at 20:02

## 2024-08-17 RX ADMIN — INSULIN LISPRO 8 UNITS: 100 INJECTION, SOLUTION INTRAVENOUS; SUBCUTANEOUS at 09:22

## 2024-08-17 ASSESSMENT — PAIN SCALES - GENERAL
PAINLEVEL_OUTOF10: 0
PAINLEVEL_OUTOF10: 0

## 2024-08-18 LAB
ALBUMIN SERPL-MCNC: 2.4 G/DL (ref 3.5–5)
ALBUMIN/GLOB SERPL: 0.7 (ref 1.1–2.2)
ALP SERPL-CCNC: 141 U/L (ref 45–117)
ALT SERPL-CCNC: 20 U/L (ref 12–78)
ANION GAP SERPL CALC-SCNC: 5 MMOL/L (ref 5–15)
AST SERPL W P-5'-P-CCNC: 17 U/L (ref 15–37)
BILIRUB SERPL-MCNC: 0.4 MG/DL (ref 0.2–1)
BUN SERPL-MCNC: 36 MG/DL (ref 6–20)
BUN/CREAT SERPL: 29 (ref 12–20)
CA-I BLD-MCNC: 8.9 MG/DL (ref 8.5–10.1)
CHLORIDE SERPL-SCNC: 95 MMOL/L (ref 97–108)
CO2 SERPL-SCNC: 37 MMOL/L (ref 21–32)
CREAT SERPL-MCNC: 1.25 MG/DL (ref 0.55–1.02)
EKG ATRIAL RATE: 63 BPM
EKG DIAGNOSIS: NORMAL
EKG Q-T INTERVAL: 458 MS
EKG QRS DURATION: 158 MS
EKG QTC CALCULATION (BAZETT): 494 MS
EKG R AXIS: 86 DEGREES
EKG T AXIS: -20 DEGREES
EKG VENTRICULAR RATE: 70 BPM
ERYTHROCYTE [DISTWIDTH] IN BLOOD BY AUTOMATED COUNT: 14.9 % (ref 11.5–14.5)
GLOBULIN SER CALC-MCNC: 3.6 G/DL (ref 2–4)
GLUCOSE BLD STRIP.AUTO-MCNC: 260 MG/DL (ref 65–100)
GLUCOSE BLD STRIP.AUTO-MCNC: 315 MG/DL (ref 65–100)
GLUCOSE BLD STRIP.AUTO-MCNC: 354 MG/DL (ref 65–100)
GLUCOSE BLD STRIP.AUTO-MCNC: 387 MG/DL (ref 65–100)
GLUCOSE SERPL-MCNC: 267 MG/DL (ref 65–100)
HCT VFR BLD AUTO: 39.9 % (ref 35–47)
HGB BLD-MCNC: 13.2 G/DL (ref 11.5–16)
INR PPP: 1.7 (ref 0.9–1.1)
MCH RBC QN AUTO: 30.3 PG (ref 26–34)
MCHC RBC AUTO-ENTMCNC: 33.1 G/DL (ref 30–36.5)
MCV RBC AUTO: 91.5 FL (ref 80–99)
NRBC # BLD: 0 K/UL (ref 0–0.01)
NRBC BLD-RTO: 0 PER 100 WBC
PERFORMED BY:: ABNORMAL
PLATELET # BLD AUTO: 400 K/UL (ref 150–400)
PMV BLD AUTO: 8.8 FL (ref 8.9–12.9)
POTASSIUM SERPL-SCNC: 3.3 MMOL/L (ref 3.5–5.1)
PROT SERPL-MCNC: 6 G/DL (ref 6.4–8.2)
PROTHROMBIN TIME: 20 SEC (ref 11.9–14.6)
RBC # BLD AUTO: 4.36 M/UL (ref 3.8–5.2)
SODIUM SERPL-SCNC: 137 MMOL/L (ref 136–145)
WBC # BLD AUTO: 11.3 K/UL (ref 3.6–11)

## 2024-08-18 PROCEDURE — 6370000000 HC RX 637 (ALT 250 FOR IP): Performed by: FAMILY MEDICINE

## 2024-08-18 PROCEDURE — 82962 GLUCOSE BLOOD TEST: CPT

## 2024-08-18 PROCEDURE — 94761 N-INVAS EAR/PLS OXIMETRY MLT: CPT

## 2024-08-18 PROCEDURE — 97530 THERAPEUTIC ACTIVITIES: CPT

## 2024-08-18 PROCEDURE — 6360000002 HC RX W HCPCS: Performed by: FAMILY MEDICINE

## 2024-08-18 PROCEDURE — 80053 COMPREHEN METABOLIC PANEL: CPT

## 2024-08-18 PROCEDURE — 1100000000 HC RM PRIVATE

## 2024-08-18 PROCEDURE — 85610 PROTHROMBIN TIME: CPT

## 2024-08-18 PROCEDURE — 2580000003 HC RX 258: Performed by: FAMILY MEDICINE

## 2024-08-18 PROCEDURE — 85027 COMPLETE CBC AUTOMATED: CPT

## 2024-08-18 PROCEDURE — 5A09357 ASSISTANCE WITH RESPIRATORY VENTILATION, LESS THAN 24 CONSECUTIVE HOURS, CONTINUOUS POSITIVE AIRWAY PRESSURE: ICD-10-PCS | Performed by: FAMILY MEDICINE

## 2024-08-18 PROCEDURE — 36415 COLL VENOUS BLD VENIPUNCTURE: CPT

## 2024-08-18 PROCEDURE — 6370000000 HC RX 637 (ALT 250 FOR IP)

## 2024-08-18 RX ORDER — BUMETANIDE 1 MG/1
1 TABLET ORAL DAILY
Status: DISCONTINUED | OUTPATIENT
Start: 2024-08-18 | End: 2024-08-19 | Stop reason: HOSPADM

## 2024-08-18 RX ORDER — POTASSIUM CHLORIDE 750 MG/1
40 TABLET, FILM COATED, EXTENDED RELEASE ORAL ONCE
Status: COMPLETED | OUTPATIENT
Start: 2024-08-18 | End: 2024-08-18

## 2024-08-18 RX ADMIN — INSULIN LISPRO 12 UNITS: 100 INJECTION, SOLUTION INTRAVENOUS; SUBCUTANEOUS at 12:38

## 2024-08-18 RX ADMIN — CEFTRIAXONE SODIUM 1000 MG: 1 INJECTION, POWDER, FOR SOLUTION INTRAMUSCULAR; INTRAVENOUS at 21:28

## 2024-08-18 RX ADMIN — POTASSIUM CHLORIDE 10 MEQ: 750 TABLET, EXTENDED RELEASE ORAL at 09:49

## 2024-08-18 RX ADMIN — HUMAN INSULIN 24 UNITS: 100 INJECTION, SUSPENSION SUBCUTANEOUS at 06:22

## 2024-08-18 RX ADMIN — ACETAMINOPHEN 1000 MG: 500 TABLET ORAL at 21:26

## 2024-08-18 RX ADMIN — INSULIN LISPRO 16 UNITS: 100 INJECTION, SOLUTION INTRAVENOUS; SUBCUTANEOUS at 17:05

## 2024-08-18 RX ADMIN — EZETIMIBE 10 MG: 10 TABLET ORAL at 21:27

## 2024-08-18 RX ADMIN — INSULIN LISPRO 4 UNITS: 100 INJECTION, SOLUTION INTRAVENOUS; SUBCUTANEOUS at 21:46

## 2024-08-18 RX ADMIN — BUMETANIDE 1 MG: 1 TABLET ORAL at 12:38

## 2024-08-18 RX ADMIN — WARFARIN SODIUM 6 MG: 5 TABLET ORAL at 17:06

## 2024-08-18 RX ADMIN — INSULIN LISPRO 8 UNITS: 100 INJECTION, SOLUTION INTRAVENOUS; SUBCUTANEOUS at 09:49

## 2024-08-18 RX ADMIN — LETROZOLE 2.5 MG: 2.5 TABLET ORAL at 09:49

## 2024-08-18 RX ADMIN — SODIUM CHLORIDE, PRESERVATIVE FREE 10 ML: 5 INJECTION INTRAVENOUS at 09:52

## 2024-08-18 RX ADMIN — SODIUM CHLORIDE, PRESERVATIVE FREE 10 ML: 5 INJECTION INTRAVENOUS at 21:29

## 2024-08-18 RX ADMIN — HUMAN INSULIN 24 UNITS: 100 INJECTION, SUSPENSION SUBCUTANEOUS at 16:22

## 2024-08-18 RX ADMIN — ATORVASTATIN CALCIUM 40 MG: 40 TABLET, FILM COATED ORAL at 21:26

## 2024-08-18 RX ADMIN — LATANOPROST 1 DROP: 50 SOLUTION OPHTHALMIC at 21:40

## 2024-08-18 RX ADMIN — POTASSIUM CHLORIDE 40 MEQ: 750 TABLET, EXTENDED RELEASE ORAL at 12:38

## 2024-08-18 ASSESSMENT — PAIN DESCRIPTION - ORIENTATION
ORIENTATION: LEFT

## 2024-08-18 ASSESSMENT — PAIN SCALES - GENERAL
PAINLEVEL_OUTOF10: 7
PAINLEVEL_OUTOF10: 9
PAINLEVEL_OUTOF10: 8

## 2024-08-18 ASSESSMENT — PAIN DESCRIPTION - DESCRIPTORS
DESCRIPTORS: DULL;TENDER;THROBBING
DESCRIPTORS: SHARP;TENDER

## 2024-08-18 ASSESSMENT — PAIN DESCRIPTION - LOCATION
LOCATION: LEG
LOCATION: LEG
LOCATION: ANKLE

## 2024-08-19 VITALS
OXYGEN SATURATION: 94 % | SYSTOLIC BLOOD PRESSURE: 123 MMHG | DIASTOLIC BLOOD PRESSURE: 60 MMHG | WEIGHT: 199.74 LBS | HEIGHT: 61 IN | HEART RATE: 70 BPM | TEMPERATURE: 98.2 F | RESPIRATION RATE: 19 BRPM | BODY MASS INDEX: 37.71 KG/M2

## 2024-08-19 LAB
ALBUMIN SERPL-MCNC: 2.4 G/DL (ref 3.5–5)
ALBUMIN/GLOB SERPL: 0.7 (ref 1.1–2.2)
ALP SERPL-CCNC: 139 U/L (ref 45–117)
ALT SERPL-CCNC: 21 U/L (ref 12–78)
ANION GAP SERPL CALC-SCNC: 8 MMOL/L (ref 5–15)
AST SERPL W P-5'-P-CCNC: 19 U/L (ref 15–37)
BILIRUB SERPL-MCNC: 0.4 MG/DL (ref 0.2–1)
BNP SERPL-MCNC: 1378 PG/ML
BUN SERPL-MCNC: 28 MG/DL (ref 6–20)
BUN/CREAT SERPL: 25 (ref 12–20)
CA-I BLD-MCNC: 9.1 MG/DL (ref 8.5–10.1)
CHLORIDE SERPL-SCNC: 100 MMOL/L (ref 97–108)
CO2 SERPL-SCNC: 31 MMOL/L (ref 21–32)
CREAT SERPL-MCNC: 1.11 MG/DL (ref 0.55–1.02)
ERYTHROCYTE [DISTWIDTH] IN BLOOD BY AUTOMATED COUNT: 15.5 % (ref 11.5–14.5)
GLOBULIN SER CALC-MCNC: 3.6 G/DL (ref 2–4)
GLUCOSE BLD STRIP.AUTO-MCNC: 231 MG/DL (ref 65–100)
GLUCOSE BLD STRIP.AUTO-MCNC: 247 MG/DL (ref 65–100)
GLUCOSE BLD STRIP.AUTO-MCNC: 299 MG/DL (ref 65–100)
GLUCOSE SERPL-MCNC: 302 MG/DL (ref 65–100)
HCT VFR BLD AUTO: 41.4 % (ref 35–47)
HGB BLD-MCNC: 13.5 G/DL (ref 11.5–16)
INR PPP: 1.6 (ref 0.9–1.1)
MCH RBC QN AUTO: 30.1 PG (ref 26–34)
MCHC RBC AUTO-ENTMCNC: 32.6 G/DL (ref 30–36.5)
MCV RBC AUTO: 92.2 FL (ref 80–99)
NRBC # BLD: 0 K/UL (ref 0–0.01)
NRBC BLD-RTO: 0 PER 100 WBC
PERFORMED BY:: ABNORMAL
PLATELET # BLD AUTO: 378 K/UL (ref 150–400)
PMV BLD AUTO: 9 FL (ref 8.9–12.9)
POTASSIUM SERPL-SCNC: 3.7 MMOL/L (ref 3.5–5.1)
PROT SERPL-MCNC: 6 G/DL (ref 6.4–8.2)
PROTHROMBIN TIME: 19.8 SEC (ref 11.9–14.6)
RBC # BLD AUTO: 4.49 M/UL (ref 3.8–5.2)
SODIUM SERPL-SCNC: 139 MMOL/L (ref 136–145)
WBC # BLD AUTO: 8.7 K/UL (ref 3.6–11)

## 2024-08-19 PROCEDURE — 97530 THERAPEUTIC ACTIVITIES: CPT

## 2024-08-19 PROCEDURE — 85610 PROTHROMBIN TIME: CPT

## 2024-08-19 PROCEDURE — 97110 THERAPEUTIC EXERCISES: CPT

## 2024-08-19 PROCEDURE — 6370000000 HC RX 637 (ALT 250 FOR IP)

## 2024-08-19 PROCEDURE — 6370000000 HC RX 637 (ALT 250 FOR IP): Performed by: FAMILY MEDICINE

## 2024-08-19 PROCEDURE — 83880 ASSAY OF NATRIURETIC PEPTIDE: CPT

## 2024-08-19 PROCEDURE — 36415 COLL VENOUS BLD VENIPUNCTURE: CPT

## 2024-08-19 PROCEDURE — 82962 GLUCOSE BLOOD TEST: CPT

## 2024-08-19 PROCEDURE — 97535 SELF CARE MNGMENT TRAINING: CPT

## 2024-08-19 PROCEDURE — 80053 COMPREHEN METABOLIC PANEL: CPT

## 2024-08-19 PROCEDURE — 85027 COMPLETE CBC AUTOMATED: CPT

## 2024-08-19 RX ORDER — WARFARIN SODIUM 5 MG/1
3 TABLET ORAL DAILY
Qty: 30 TABLET | Refills: 3 | Status: SHIPPED | OUTPATIENT
Start: 2024-08-19

## 2024-08-19 RX ORDER — CIPROFLOXACIN 500 MG/1
500 TABLET, FILM COATED ORAL 2 TIMES DAILY
Qty: 10 TABLET | Refills: 0 | Status: SHIPPED | OUTPATIENT
Start: 2024-08-19 | End: 2024-08-24

## 2024-08-19 RX ORDER — BUMETANIDE 1 MG/1
1 TABLET ORAL DAILY
Qty: 30 TABLET | Refills: 3 | Status: SHIPPED | OUTPATIENT
Start: 2024-08-20

## 2024-08-19 RX ADMIN — DILTIAZEM HYDROCHLORIDE 300 MG: 300 CAPSULE, COATED, EXTENDED RELEASE ORAL at 09:55

## 2024-08-19 RX ADMIN — HUMAN INSULIN 24 UNITS: 100 INJECTION, SUSPENSION SUBCUTANEOUS at 06:31

## 2024-08-19 RX ADMIN — INSULIN LISPRO 8 UNITS: 100 INJECTION, SOLUTION INTRAVENOUS; SUBCUTANEOUS at 09:56

## 2024-08-19 RX ADMIN — TRAMADOL HYDROCHLORIDE 50 MG: 50 TABLET ORAL at 09:57

## 2024-08-19 RX ADMIN — BUMETANIDE 1 MG: 1 TABLET ORAL at 09:55

## 2024-08-19 RX ADMIN — DIGOXIN 125 MCG: 0.25 TABLET ORAL at 09:55

## 2024-08-19 RX ADMIN — LETROZOLE 2.5 MG: 2.5 TABLET ORAL at 09:55

## 2024-08-19 RX ADMIN — Medication: at 09:56

## 2024-08-19 RX ADMIN — POTASSIUM CHLORIDE 10 MEQ: 750 TABLET, EXTENDED RELEASE ORAL at 09:55

## 2024-08-19 RX ADMIN — METOPROLOL TARTRATE 50 MG: 50 TABLET, FILM COATED ORAL at 09:55

## 2024-08-19 RX ADMIN — INSULIN LISPRO 4 UNITS: 100 INJECTION, SOLUTION INTRAVENOUS; SUBCUTANEOUS at 12:41

## 2024-08-19 ASSESSMENT — ENCOUNTER SYMPTOMS: SHORTNESS OF BREATH: 1

## 2024-08-19 ASSESSMENT — PAIN DESCRIPTION - LOCATION
LOCATION: LEG
LOCATION: LEG

## 2024-08-19 ASSESSMENT — PAIN SCALES - GENERAL
PAINLEVEL_OUTOF10: 10
PAINLEVEL_OUTOF10: 9

## 2024-08-19 ASSESSMENT — PAIN DESCRIPTION - ORIENTATION: ORIENTATION: LEFT

## 2024-08-19 NOTE — DISCHARGE INSTRUCTIONS
Discharge Instructions       PATIENT ID: Ary Munoz  MRN: [id number]   YOB: 1950    DATE OF ADMISSION: 8/12/2024  DATE OF DISCHARGE: 8/19/2024    PRIMARY CARE PROVIDER: [unfilled]     ATTENDING PHYSICIAN: Gilda Foote MD   DISCHARGING PROVIDER: Gilda Foote MD    To contact this individual call [phone] and ask the  to page.   If unavailable, ask to be transferred the Adult Hospitalist Department.    DISCHARGE DIAGNOSES A-fib CHF respiratory failure    CONSULTATIONS: [unfilled]      PROCEDURES/SURGERIES: * No surgery found *    PENDING TEST RESULTS:   At the time of discharge the following test results are still pending: None    FOLLOW UP APPOINTMENTS:   Gilda Foote MD  [address] [place] [address]   [facility] [id number] [phone]    Schedule an appointment as soon as possible for a visit in 1 week(s)         ADDITIONAL CARE RECOMMENDATIONS: Monitor electrolytes and oxygen saturation/monitor PT/INR keep INR between 2 and 3  Cardiac diabetic diet    DIET: {diet:80695}      ACTIVITY: activity as tolerated    Wound care: Wound Care Order:  submitted to Case Management.  Please view [email or web address]     EQUIPMENT needed: ***      DISCHARGE MEDICATIONS:   See Medication Reconciliation Form    It is important that you take the medication exactly as they are prescribed.   Keep your medication in the bottles provided by the pharmacist and keep a list of the medication names, dosages, and times to be taken in your wallet.   Do not take other medications without consulting your doctor.       NOTIFY YOUR PHYSICIAN FOR ANY OF THE FOLLOWING:   Fever over 101 degrees for 24 hours.   Chest pain, shortness of breath, fever, chills, nausea, vomiting, diarrhea, change in mentation, falling, weakness, bleeding. Severe pain or pain not relieved by medications.  Or, any other signs or symptoms that you may have questions about.      DISPOSITION:    Home With:   OT  PT  SHANNAN  RN

## 2024-08-19 NOTE — DISCHARGE SUMMARY
Discharge Summary       PATIENT ID: Ary Munoz  MRN: [id number]   YOB: 1950    DATE OF ADMISSION: 8/12/2024   DATE OF DISCHARGE:   PRIMARY CARE PROVIDER: @[email or web address]@      ATTENDING PHYSICIAN: Gilda Foote  DISCHARGING PROVIDER: Gilda Foote      CONSULTATIONS: IP CONSULT TO CARDIOLOGY  IP CONSULT TO PULMONOLOGY  IP CONSULT TO PHARMACY  IP CONSULT TO ORTHOPEDIC SURGERY  IP WOUND CARE NURSE CONSULT TO EVAL    PROCEDURES/SURGERIES: * No surgery found *    ADMITTING DIAGNOSES:    Patient Active Problem List    Diagnosis Date Noted    CHF with unknown LVEF ([facility]) 08/12/2024    CHF (congestive heart failure), NYHA class I, acute on chronic, combined ([facility]) 08/12/2024       DISCHARGE DIAGNOSES / PLAN:    Acute congestive heart failure with active diastolic dysfunction  Acute hypoxic respiratory failure likely from CHF pleural effusion  Type 2 diabetes uncontrolled  Bilateral pleural effusions  History of chronic A-fib with pacemaker on Coumadin  Hypertension\history of breast cancer right mastectomy status postradiation\  obstructive sleep apnea on CPAP  Right knee pain/   UTI  Hypotension     Recent left ankle fracture status postsurgery     CT chest without contrast performed (8/12) - no pulmonary embolus.  Bilateral pleural effusions, greater on the left     EKG demonstrates ventricular paced rhythm     X-ray of the right knee negative        Repeat the labs in the am     Consulted cardiology 8/16- No further cardiovascular testing based on current clinical assessment and we will follow while patient is in the hospital along with the team on as needed basis from now onward.      Increase NPH to 24 units subcu twice a day  Replace potassium  Continue IV Rocephin for UTI  Change Bumex to p.o.     Consulted pulmonology 8/18-having good oxygen no need for supplemental oxygen.  She has a CPAP machine at home          ADDITIONAL CARE RECOMMENDATIONS:         DISCHARGE MEDICATIONS:     Medication List    
pleural effusions, greater on the left     EKG demonstrates ventricular paced rhythm     8/13  HPI-patient is alert and awake on 3 L nasal cannula.  She endorses shortness of breath.     Vitals-/57 pulse 70 temperature 97.7 respirations 22 O2 97%     Labs-glucose 245, creatinine 1.26, GFR 45, BNP 2683, , prothrombin time 16.6. INR 1.3     EKG demonstrates ventricular paced rhythm     Echo performed 8/12-results pending     X-ray left ankle performed 8/12-  Patient is post distal fibular fracture repair. Fracture line is identified. Fracture is in anatomic alignment. Ankle mortise is not widened. There is bony irregularity base of the medial malleolus. This may be fracture but bony detail is obscured.  Prominent degenerative changes of the midfoot.     Consulted orthopedics 8/12- Patient is to remain NWB on lower left extremity.Recommend patient remains on DVT prophylaxis.  Given patient's heart history will let medicine decide choice of anticoagulation medication. Recommend PT/OT when medically stable. Continue multimodal pain control with Tylenol Q8 and tramadol every 6 hours as needed for severe pain.     Consulted cardiology 8/13- patient is otherwise clinically and hemodynamically stable and continue current conservative medical management.      Patient was seen by the cardiology     8/14     Patient alert awake no distress having some shortness of breath  BNP increased to 2000  3.4  Seen by cardiology no further workup at this time     8/15     Patient alert awake on 3 L oxygen by nasal cannula  BNP 2309     8/16  HPI-patient was lying down in no apparent distress.  She endorses sharp chest pain that comes and goes with radiation to her left arm.  She also mentioned chills.  She denies shortness of breath, palpitations, fever, headache, abdominal pain and diarrhea.     Vitals-/88 pulse 70 temperature 98.3 respiration 16 O2 93%     Labs-BUN 28, creatinine 1.15, GFR 50, glucose 281, BNP 2064,

## 2024-08-19 NOTE — CARE COORDINATION
CM reviewed chart.    DCP: [place] to [facility] [address] [facility] [place]      Patient can go to room 100-1. Call nurse report to: [phone]    Stretcher transport set for 1350. Primary nurse notified.    Transition of Care Plan:    RUR: 15%  Prior Level of Functioning: Assist  Dis[facility] [address] [facility] [place]  If SNF or IPR: Date FOC offered: 8/13/2024  Date FOC received:   Accepting facility: [facility]  Date authorization started with reference number: N/A  Date authorization received and expires:   Follow up appointments: Discharge summary  DME needed:   Transportation at discharge: Stretcher  IM/IMM Medicare/ letter given: N/A  Is patient a [place] and connected with [facility]?    If yes, was  transfer form completed and [facility] notified?   Caregiver Contact: Pts spouse notified via telephone  Discharge Caregiver contacted prior to discharge? Nurse report  Care Conference needed? N/A  Barriers to discharge: N/A      
CM reviewed patients chart. Her address on file is [facility].  CM met with patient at bedside.  Patient confirms that she has been at [facility] since June 8th.  CM sent referral back to [facility].  [facility] at [facility]. She will need stretcher transport to return.   
Patient has been accepted at [facility].   has asked [facility] for a bed assignment today.  [facility] has given a bed assignment if patient is ready for discharge today.  Bed assignment is 100-1.   
Patient has been accepted to return to [facility] when medically stable for discharge.  CM has sent updated clinicals to SNF for review.   
Patient has been accepted to return to [facility] located at [address] [place] [address] when medically stable for discharge. CM has sent clinical updates to SNF for review.   
No   Potential Assistance Purchasing Medications No   Type of Home Care Services None   Patient expects to be discharged to: House   One/Two Story Residence Two story, live on first floor   Lift Chair Available Yes  (In garage)   History of falls? 0   Services At/After Discharge   Transition of Care Consult (CM Consult) Discharge Planning   Services At/After Discharge None    Resource Information Provided? No   Mode of Transport at Discharge BLS  (Transport)   Confirm Follow Up Transport Other (see comment)     CM met with pt & D/C Plan is home with  via transport. Send Rxs to [facility] upon discharge. Assist with ADLs/uses walker/w/c.     Advance Care Planning     General Advance Care Planning (ACP) Conversation    Date of Conversation: 8/12/2024  Conducted with:   Other persons present:     Healthcare Decision Maker:   Primary Decision Maker: DENNYS BAKRER - Spouse - [phone]       Content/Action Overview:    Reviewed DNR/DNI and patient         Length of Voluntary ACP Conversation in minutes:      Rosa Hunter RN

## 2024-08-19 NOTE — PLAN OF CARE
PHYSICAL THERAPY TREATMENT     Patient: Ary Munoz (74 y.o. female)  Date: 8/16/2024  Diagnosis: Hypoxemia [R09.02]  CHF with unknown LVEF (HCC) [I50.9]  CHF (congestive heart failure), NYHA class I, acute on chronic, combined (HCC) [I50.43]  Chest pain, unspecified type [R07.9]  Acute on chronic congestive heart failure, unspecified heart failure type (HCC) [I50.9] CHF with unknown LVEF (HCC)      Precautions: Bed Alarm, Fall Risk, Weight Bearing     Left Lower Extremity Weight Bearing: Non Weight Bearing                Recommendations for nursing mobility: Encourage HEP in prep for ADLs/mobility; see handout for details, Use of bed/chair alarm for safety, and Maxi Move for bed to chair transfers    In place during session: Nasal Cannula 3L and EKG/telemetry   Chart, physical therapy assessment, plan of care and goals were reviewed.  ASSESSMENT  Patient continues with skilled PT services and is progressing towards goals. Pt received sitting EOB upon PT arrival, agreeable to session. Pt A&O x 4. (See below for objective details and assist levels). OT in with pt. Upon arrival. Partial co TX needed for Tfs. Pt. Required Max A X 2 for standing with RW. Pt. Needed assist to keep L LE NWB while standing. Pt. Having difficulty pushing through UE s to stand. Pt. Declined sitting in chair. Discussed importance of mobility and sitting in chair. Pt. Agreed to try next session. Pt. Performed LE exercises for R LE sitting EOB.     Overall pt tolerated session fair today with Tfs.  Will continue to benefit from skilled PT services, and will continue to progress as tolerated. Potential barriers for safe discharge: pts current support system is unable to meet their requirements for physical assistance, pt is a high fall risk, pt is not safe to be alone, concern for pt safely navigating or managing the home environment, and pt has new weight bearing restrictions limiting activity or patient is unable to maintain. 
         PHYSICAL THERAPY TREATMENT     Patient: Ary Munoz (74 y.o. female)  Date: 8/18/2024  Diagnosis: Hypoxemia [R09.02]  CHF with unknown LVEF (HCC) [I50.9]  CHF (congestive heart failure), NYHA class I, acute on chronic, combined (HCC) [I50.43]  Chest pain, unspecified type [R07.9]  Acute on chronic congestive heart failure, unspecified heart failure type (HCC) [I50.9] CHF with unknown LVEF (HCC)      Precautions: Bed Alarm, Fall Risk, Weight Bearing     Left Lower Extremity Weight Bearing: Non Weight Bearing                Recommendations for nursing mobility: Encourage HEP in prep for ADLs/mobility; see handout for details and Frequent repositioning to prevent skin breakdown    In place during session: External Catheter  Chart, physical therapy assessment, plan of care and goals were reviewed.  ASSESSMENT  Patient continues with skilled PT services and is slowly progressing towards goals. Pt semi supine in bed upon PT arrival, agreeable to session with PTA/PERDOMO. Pt A&O x 4. (See below for objective details and assist levels).     Overall pt tolerated session fair today with pt able to perform bed mobility tasks at MinAx2. Pt sat EOB for approx 12' while performing UE/LE therex and wt shifting forward, no LOB noted throughout. X1 STS transfer to FWW performed with pt requiring maxAx2 to achieve with manual assistance required for AD as pt was not compliant in pushing from EOB vs pulling from FWW. Pt returned to seated position abruptly and stated that would be all she was doing for the day. Will continue to benefit from skilled PT services, and will continue to progress as tolerated. Potential barriers for safe discharge: pt is a high fall risk, pt is not safe to be alone, and concern for pt safely navigating or managing the home environment. Current PT DC recommendation Skilled Nursing Facility once medically appropriate.     Start of Session End of Session   SPO2 (%) 95 71   Heart Rate (BPM) 91 70 
         PHYSICAL THERAPY TREATMENT     Patient: Ary Munoz (74 y.o. female)  Date: 8/19/2024  Diagnosis: Hypoxemia [R09.02]  CHF with unknown LVEF (HCC) [I50.9]  CHF (congestive heart failure), NYHA class I, acute on chronic, combined (HCC) [I50.43]  Chest pain, unspecified type [R07.9]  Acute on chronic congestive heart failure, unspecified heart failure type (HCC) [I50.9] CHF with unknown LVEF (HCC)      Precautions: Bed Alarm, Fall Risk, Weight Bearing     Left Lower Extremity Weight Bearing: Non Weight Bearing                Recommendations for nursing mobility: Encourage HEP in prep for ADLs/mobility; see handout for details, Frequent repositioning to prevent skin breakdown, and NWB on LLE    In place during session: Nasal Cannula 2L and External Catheter  Chart, physical therapy assessment, plan of care and goals were reviewed.  ASSESSMENT  Patient continues with skilled PT services and is slowly progressing towards goals. Pt lying in semi-supine position upon PT arrival, agreeable to session. Pt A&O x 3. (See below for objective details and assist levels).     Overall pt tolerated session fair today with PT/OT.  patient on RA when enter room with O2 SATs at 87%. RN in room and request to place patient on 2l/m O2 per NC and O2 SATs increased to 94%. Patient rolled to R with vc's and mod assist x 2. Patient transfer supine>sit with min assist x 1. Patient scoot to eob with sba and vc's. Patient perform there-ex in supine and sit. Patient transfer sit>3/4 stand to rw with max assist x 2 NWB LLE 2x's with sitting rest between each stand. Patient on 2nd stand stood ~ 5 seconds in 3/4 stand position before having to transfer back to sit.  Following treatment patient positioned with LLE elevated on pillow. Will continue to benefit from skilled PT services, and will continue to progress as tolerated. Potential barriers for safe discharge: pts current support system is unable to meet their requirements for 
  Problem: Discharge Planning  Goal: Discharge to home or other facility with appropriate resources  8/19/2024 1128 by Morgan Nieves RN  Outcome: Progressing  8/18/2024 2320 by Kira Fajardo RN  Outcome: Progressing  Flowsheets (Taken 8/18/2024 2131)  Discharge to home or other facility with appropriate resources: Identify barriers to discharge with patient and caregiver     Problem: Pain  Goal: Verbalizes/displays adequate comfort level or baseline comfort level  8/19/2024 1128 by Morgan Nieves RN  Outcome: Progressing  8/18/2024 2320 by Kria Fajardo RN  Outcome: Progressing     Problem: Safety - Adult  Goal: Free from fall injury  8/19/2024 1128 by Morgan Nieves RN  Outcome: Progressing  8/18/2024 2320 by Kira Fajardo RN  Outcome: Progressing     Problem: Skin/Tissue Integrity  Goal: Absence of new skin breakdown  Description: 1.  Monitor for areas of redness and/or skin breakdown  2.  Assess vascular access sites hourly  3.  Every 4-6 hours minimum:  Change oxygen saturation probe site  4.  Every 4-6 hours:  If on nasal continuous positive airway pressure, respiratory therapy assess nares and determine need for appliance change or resting period.  8/19/2024 1128 by Morgan Nieves RN  Outcome: Progressing  8/18/2024 2320 by Kira Fajardo RN  Outcome: Progressing     Problem: Chronic Conditions and Co-morbidities  Goal: Patient's chronic conditions and co-morbidity symptoms are monitored and maintained or improved  8/19/2024 1128 by Morgan Nieves RN  Outcome: Progressing  8/18/2024 2320 by Kira Fajardo RN  Outcome: Progressing     Problem: Occupational Therapy - Adult  Goal: By Discharge: Performs self-care activities at highest level of function for planned discharge setting.  See evaluation for individualized goals.  Description: FUNCTIONAL STATUS PRIOR TO ADMISSION:  Pt required assistance with functional mobility and ADLs at SNF.    HOME SUPPORT: Pt was at a SNF for rehab services.    Occupational 
  Problem: Discharge Planning  Goal: Discharge to home or other facility with appropriate resources  Outcome: Progressing     Problem: Pain  Goal: Verbalizes/displays adequate comfort level or baseline comfort level  Outcome: Progressing     Problem: Safety - Adult  Goal: Free from fall injury  Outcome: Progressing     Problem: ABCDS Injury Assessment  Goal: Absence of physical injury  Outcome: Progressing     Problem: Skin/Tissue Integrity  Goal: Absence of new skin breakdown  Description: 1.  Monitor for areas of redness and/or skin breakdown  2.  Assess vascular access sites hourly  3.  Every 4-6 hours minimum:  Change oxygen saturation probe site  4.  Every 4-6 hours:  If on nasal continuous positive airway pressure, respiratory therapy assess nares and determine need for appliance change or resting period.  Outcome: Progressing     
  Problem: Discharge Planning  Goal: Discharge to home or other facility with appropriate resources  Outcome: Progressing     Problem: Pain  Goal: Verbalizes/displays adequate comfort level or baseline comfort level  Outcome: Progressing     Problem: Safety - Adult  Goal: Free from fall injury  Outcome: Progressing     Problem: ABCDS Injury Assessment  Goal: Absence of physical injury  Outcome: Progressing     Problem: Skin/Tissue Integrity  Goal: Absence of new skin breakdown  Description: 1.  Monitor for areas of redness and/or skin breakdown  2.  Assess vascular access sites hourly  3.  Every 4-6 hours minimum:  Change oxygen saturation probe site  4.  Every 4-6 hours:  If on nasal continuous positive airway pressure, respiratory therapy assess nares and determine need for appliance change or resting period.  Outcome: Progressing     Problem: Chronic Conditions and Co-morbidities  Goal: Patient's chronic conditions and co-morbidity symptoms are monitored and maintained or improved  Outcome: Progressing     
  Problem: Discharge Planning  Goal: Discharge to home or other facility with appropriate resources  Outcome: Progressing  Flowsheets (Taken 8/15/2024 1939)  Discharge to home or other facility with appropriate resources:   Identify barriers to discharge with patient and caregiver   Arrange for needed discharge resources and transportation as appropriate   Arrange for interpreters to assist at discharge as needed   Identify discharge learning needs (meds, wound care, etc)   Refer to discharge planning if patient needs post-hospital services based on physician order or complex needs related to functional status, cognitive ability or social support system     Problem: Pain  Goal: Verbalizes/displays adequate comfort level or baseline comfort level  Outcome: Progressing     Problem: Safety - Adult  Goal: Free from fall injury  Outcome: Progressing     Problem: ABCDS Injury Assessment  Goal: Absence of physical injury  Outcome: Progressing     Problem: Skin/Tissue Integrity  Goal: Absence of new skin breakdown  Description: 1.  Monitor for areas of redness and/or skin breakdown  2.  Assess vascular access sites hourly  3.  Every 4-6 hours minimum:  Change oxygen saturation probe site  4.  Every 4-6 hours:  If on nasal continuous positive airway pressure, respiratory therapy assess nares and determine need for appliance change or resting period.  Outcome: Progressing     Problem: Chronic Conditions and Co-morbidities  Goal: Patient's chronic conditions and co-morbidity symptoms are monitored and maintained or improved  Outcome: Progressing  Flowsheets (Taken 8/15/2024 1939)  Care Plan - Patient's Chronic Conditions and Co-Morbidity Symptoms are Monitored and Maintained or Improved:   Monitor and assess patient's chronic conditions and comorbid symptoms for stability, deterioration, or improvement   Collaborate with multidisciplinary team to address chronic and comorbid conditions and prevent exacerbation or 
  Problem: Discharge Planning  Goal: Discharge to home or other facility with appropriate resources  Outcome: Progressing  Flowsheets (Taken 8/16/2024 2020 by Soledad Phillips, RN)  Discharge to home or other facility with appropriate resources:   Identify barriers to discharge with patient and caregiver   Arrange for needed discharge resources and transportation as appropriate   Identify discharge learning needs (meds, wound care, etc)   Arrange for interpreters to assist at discharge as needed   Refer to discharge planning if patient needs post-hospital services based on physician order or complex needs related to functional status, cognitive ability or social support system     Problem: Pain  Goal: Verbalizes/displays adequate comfort level or baseline comfort level  Outcome: Progressing     Problem: Safety - Adult  Goal: Free from fall injury  Outcome: Progressing     Problem: ABCDS Injury Assessment  Goal: Absence of physical injury  Outcome: Progressing     Problem: Skin/Tissue Integrity  Goal: Absence of new skin breakdown  Description: 1.  Monitor for areas of redness and/or skin breakdown  2.  Assess vascular access sites hourly  3.  Every 4-6 hours minimum:  Change oxygen saturation probe site  4.  Every 4-6 hours:  If on nasal continuous positive airway pressure, respiratory therapy assess nares and determine need for appliance change or resting period.  Outcome: Progressing     Problem: Chronic Conditions and Co-morbidities  Goal: Patient's chronic conditions and co-morbidity symptoms are monitored and maintained or improved  Outcome: Progressing  Flowsheets (Taken 8/16/2024 2020 by Soledad Phillips, RN)  Care Plan - Patient's Chronic Conditions and Co-Morbidity Symptoms are Monitored and Maintained or Improved:   Monitor and assess patient's chronic conditions and comorbid symptoms for stability, deterioration, or improvement   Update acute care plan with appropriate goals if chronic or comorbid 
within 7 day(s).  5.  Patient will perform all aspects of toileting with [place] within 7 day(s).  6.  Patient will participate in upper extremity therapeutic exercise/activities with [place] within 7 day(s).    8/18/2024 0925 by Adrienne Ramey OTA  Outcome: Progressing     Problem: Physical Therapy - Adult  Goal: By Discharge: Performs mobility at highest level of function for planned discharge setting.  See evaluation for individualized goals.  Description: FUNCTIONAL STATUS PRIOR TO ADMISSION: Patient was modified independent using a rollator for functional mobility.    HOME SUPPORT PRIOR TO ADMISSION: The patient lived with spouse and required minimal assistance for ADLs.    Physical Therapy Goals  Initiated 8/14/2024  Pt stated goal: to go home  Pt will be I with LE HEP in 7 days.  Pt will perform bed mobility with Modified [place] in 7 days.  Pt will perform transfers with Minimal Assist in 7 days.   Pt will amb 5-25 feet with LRAD safely with Minimal Assist in 7 days.  Pt will verbalize and demonstrate compliance with wbing precautions to include NWB LLE in 7 days.   Pt will demonstrate improvement in standing balance from poor to good/fair in 7 days.     8/18/2024 0926 by Justina Foote PTA  Outcome: Progressing     
(in order for the patient to meet his/her long term goals): Skilled Nursing Facility    IF patient discharges home will need the following DME: continuing to assess with progress       SUBJECTIVE:   Patient stated “I can't do anymore that's it.”    OBJECTIVE DATA SUMMARY:   Cognitive/Behavioral Status:  Orientation  Orientation Level: Oriented X4  Cognition  Overall Cognitive Status: WFL    Functional Mobility and Transfers for ADLs:  Bed Mobility:  Bed Mobility Training  Bed Mobility Training: Yes  Interventions: Verbal cues;Manual cues;Tactile cues  Rolling: Minimum assistance;Assist X2;Additional time  Supine to Sit: Minimum assistance;Assist X2;Additional time;Moderate assistance  Sit to Supine: Minimum assistance;Assist X1;Additional time  Scooting: Stand-by assistance;Additional time (max A x2 for scooting HOB)    Transfers:  Transfer Training  Interventions: Safety awareness training;Weight shifting training/pressure relief;Verbal cues;Manual cues  Sit to Stand: Maximum assistance;Assist X2;Additional time;Adaptive equipment  Stand to Sit: Maximum assistance;Additional time;Assist X2;Adaptive equipment      Balance:  Balance  Sitting: Intact  Standing: Impaired  Standing - Static: Constant support;Poor      ADL Intervention:     LE Dressing: Dependent/Total;Verbal cueing;Increased time to complete  LE Dressing Skilled Clinical Factors: EOB, anticipate pt will need TA for donning/doffong socks as pt attempted to touch top of sock with vc's for crossing leg method however pt unable to cross leg d/t decreased flexibility    Toileting: Dependent/Total  Toileting Skilled Clinical Factors: Supine: assist d/t decreased reach           Therapeutic exercise:    Exercise Sets Reps AROM AAROM PROM Self PROM Comments   Shoulder flex/ext 1 3 [x] [] [] [] EOB, decreased AROM   Arm circles 1 6 [x] [] [] []    Scapular retraction 1 1 [x] [] [] []         Pain Rating:  10/10   Pain Intervention(s):   rest, elevation, and 
mobility recommendations, and nsg updated     COMMUNICATION/COLLABORATION:   The patient’s plan of care was discussed with: Occupational therapy assistant    Patient Education  Education Provided: Role of Therapy;Plan of Care;Precautions;Transfer Training;Home Exercise Program;Equipment  Education Provided Comments: use of KRISTIAN gómez  Education Method: Verbal;Other (Comment) (visual, written on white board)  Barriers to Learning: None  Education Outcome: Verbalized understanding;Demonstrated understanding    PT/OT sessions occurred together for increased safety of pt and clinician.    Micki Minaya PT  Minutes: 25            
Yes  Interventions: Verbal cues;Manual cues;Visual cues  Rolling: Minimum assistance;Moderate assistance;Assist X2  Supine to Sit: Minimum assistance;Moderate assistance;Assist X2  Sit to Supine: Minimum assistance;Assist X2  Scooting: Contact-guard assistance    Transfers:  Transfer Training  Transfer Training: Yes  Interventions: Verbal cues;Safety awareness training;Weight shifting training/pressure relief  Sit to Stand: Moderate assistance;Assist X2;Additional time  Stand to Sit: Moderate assistance;Assist X2;Additional time      Balance:  Balance  Sitting: Intact  Standing: Impaired  Standing - Static: Constant support;Poor  Standing - Dynamic: Constant support;Poor      ADL Assessment:   Grooming: Setup  Grooming Skilled Clinical Factors: Providing comb to comb hair while seated EOB    Therapeutic Intervention provided:   UE therapeutic exercises, seated grooming, and functional mobility/transfers in preparation for toilet transfers    Functional Measure:    [facility] AM-PAC \"6 Clicks\"                                                       Daily Activity Inpatient Short Form  How much help from another person does the patient currently need... Total; A Lot A Little None   1.  Putting on and taking off regular lower body clothing? []  1 [x]  2 []  3 []  4   2.  Bathing (including washing, rinsing, drying)? []  1 [x]  2 []  3 []  4   3.  Toileting, which includes using toilet, bedpan or urinal? [] 1 [x]  2 []  3 []  4   4.  Putting on and taking off regular upper body clothing? []  1 []  2 [x]  3 []  4   5.  Taking care of personal grooming such as brushing teeth? []  1 []  2 [x]  3 []  4   6.  Eating meals? []  1 []  2 []  3 [x]  4   © 2007, Trustees of [facility], under license to [employer]. All rights reserved     Score: 16/24     Interpretation of Tool:  Represents clinically-significant functional categories (i.e. Activities of daily living).  Percentage of Impairment CH    0%   
  Fair  and requires rest breaks    After treatment patient left in no apparent distress:   Bed locked and in lowest position Patient left in no apparent distress in bed, Call bell within reach, Bed/ chair alarm activated, Side rails x3, and Updated patient's board on functional status and mobility recommendations and nsg updated.    COMMUNICATION/EDUCATION:   The patient’s plan of care was discussed with: Occupational therapist, Registered nurse, and Case management    Patient Education  Education Given To: Patient  Education Provided: Role of Therapy;Plan of Care;Precautions;Transfer Training;Home Exercise Program  Education Provided Comments: KRISTIAN HEP, PT POC  Education Method: Verbal  Barriers to Learning: None  Education Outcome: Verbalized understanding;Demonstrated understanding    PT/OT sessions occurred together for increased safety of pt and clinician.       Thank you for this referral.  Micki Minaya, PT  Minutes: 25

## 2024-08-20 ENCOUNTER — FOLLOWUP TELEPHONE ENCOUNTER (OUTPATIENT)
Facility: HOSPITAL | Age: 74
End: 2024-08-20

## 2024-08-22 NOTE — PROGRESS NOTES
Progress Note      8/14/2024 9:04 AM  NAME: Ary Munoz   MRN:  [id number]   Admit Diagnosis: Hypoxemia [R09.02]  CHF with unknown LVEF (HCC) [I50.9]  CHF (congestive heart failure), NYHA class I, acute on chronic, combined (HCC) [I50.43]  Chest pain, unspecified type [R07.9]  Acute on chronic congestive heart failure, unspecified heart failure type (HCC) [I50.9]      Problem List:   -Admitted to the hospital with chest pain and shortness of breath  -History of congestive heart failure with a special device implanted in the heart per patient per history  -[facility]  -Hypertension  -NIDDM  -Dyslipidemia  -Sleep apnea  -History of atrial fibrillation  -Thyroid nodule         Assessment/Plan:   Note dictated August 14, 2024  -Follow-up visit from cardiology.  Patient is lying comfortably in the bed.  -At the time of my examination this morning patient was in sinus rhythm with controlled heart rate of 74 bpm.  -No acute overnight event per my cardiovascular assessment and patient is otherwise clinically and hemodynamically stable.  -So far we have on able to obtain any records from [facility] in any further information for the device in her heart.  -Occasional paced rhythm was noticed on the telemetry.  -Cardiac enzyme has been unremarkable and echocardiogram is pending.  -For now continue current conservative medical management with no further cardiovascular testing other than echocardiogram based on my current clinical assessment.  -We will continue to follow while patient is in the hospital along with the team.             []       High complexity decision making was performed in this patient at high risk for decompensation with multiple organ involvement.    Subjective:     Ary Munoz is a 74 y.o. White (non-) female who has no known established coronary artery disease per cardiac catheterization 10 years ago with no recent cardiac workup being done per patient per history.  
        Progress Note      8/15/2024 8:52 AM  NAME: Ary Munoz   MRN:  [id number]   Admit Diagnosis: Hypoxemia [R09.02]  CHF with unknown LVEF (HCC) [I50.9]  CHF (congestive heart failure), NYHA class I, acute on chronic, combined (HCC) [I50.43]  Chest pain, unspecified type [R07.9]  Acute on chronic congestive heart failure, unspecified heart failure type (HCC) [I50.9]      Problem List:   -Admitted to the hospital with chest pain and shortness of breath  -History of congestive heart failure with a special device implanted in the heart per patient per history  -[facility]  -Hypertension  -NIDDM  -Dyslipidemia  -Sleep apnea  -History of atrial fibrillation  -Thyroid nodule         Assessment/Plan:   Note dictated August 15, 2024  -No acute overnight event and patient is lying comfortably in the bed and feeling much better  -Monitor shows paced rhythm.  -Echocardiogram done dated 8/12/2024 shows normal ejection fraction of 65%.  -Continue current conservative medical management for acute on chronic diastolic heart failure.  -No further cardiovascular testing and we will continue to follow while patient is in the hospital along with the team with conservative approach.  ===============================================================  Note dictated August 14, 2024  -Follow-up visit from cardiology.  Patient is lying comfortably in the bed.  -At the time of my examination this morning patient was in sinus rhythm with controlled heart rate of 74 bpm.  -No acute overnight event per my cardiovascular assessment and patient is otherwise clinically and hemodynamically stable.  -So far we have on able to obtain any records from [facility] in any further information for the device in her heart.  -Occasional paced rhythm was noticed on the telemetry.  -Cardiac enzyme has been unremarkable and echocardiogram is pending.  -For now continue current conservative medical management with no further cardiovascular 
        Progress Note      8/16/2024 10:41 AM  NAME: Ary Munoz   MRN:  [id number]   Admit Diagnosis: Hypoxemia [R09.02]  CHF with unknown LVEF (HCC) [I50.9]  CHF (congestive heart failure), NYHA class I, acute on chronic, combined (HCC) [I50.43]  Chest pain, unspecified type [R07.9]  Acute on chronic congestive heart failure, unspecified heart failure type (HCC) [I50.9]      Problem List:   -Admitted to the hospital with chest pain and shortness of breath  -History of congestive heart failure with a special device implanted in the heart per patient per history  -[facility].  -Hypertension  -NIDDM  -Dyslipidemia  -Sleep apnea  -History of atrial fibrillation  -Thyroid nodule         Assessment/Plan:   Note dictated August 16, 2024  -No acute overnight event per my review of the chart and nursing staff report.  -Patient is lying comfortably in the bed and no symptoms of chest pain shortness of breath or any other anginal equivalent.  -Monitor shows paced rhythm.  -No further cardiovascular testing based on my current clinical assessment and we will follow while patient is in the hospital along with the team on as needed basis from now onward.  ===============================================================  Note dictated August 15, 2024  -No acute overnight event and patient is lying comfortably in the bed and feeling much better  -Monitor shows paced rhythm.  -Echocardiogram done dated 8/12/2024 shows normal ejection fraction of 65%.  -Continue current conservative medical management for acute on chronic diastolic heart failure.  -No further cardiovascular testing and we will continue to follow while patient is in the hospital along with the team with conservative approach.  ===============================================================  Note dictated August 14, 2024  -Follow-up visit from cardiology.  Patient is lying comfortably in the bed.  -At the time of my examination this morning patient was 
  Physician Progress Note      PATIENT:               MODESTA BARKER  CSN #:                  [id number]  :                       1950  ADMIT DATE:       2024 9:20 AM  DISCH DATE:        2024 2:22 PM  RESPONDING  PROVIDER #:        Gilda Foote MD          QUERY TEXT:    Patient admitted with CHF exacerbation, noted to have atrial fibrillation and   is maintained on Coumadin. If possible, please document in progress notes and   discharge summary if you are evaluating and/or treating any of the following:?  ?  The medical record reflects the following:  Risk Factors: 73 yo female with CHF, DM, JONATAN  Clinical Indicators: EKG: Afib/flut and V-paced complexes  Treatment: Coumadin    Thank you,  Estee Lunsford RN, CCDS  Options provided:  -- Secondary hypercoagulable state in a patient with atrial fibrillation  -- Other - I will add my own diagnosis  -- Disagree - Not applicable / Not valid  -- Disagree - Clinically unable to determine / Unknown  -- Refer to Clinical Documentation Reviewer    PROVIDER RESPONSE TEXT:    This patient has secondary hypercoagulable state in a patient with atrial   fibrillation.    Query created by: Estee Lunsford on 2024 1:40 PM      Electronically signed by:  Gilda Foote MD 2024 7:38 PM          
4 Eyes Skin Assessment     NAME:  Ary Munoz  YOB: 1950  MEDICAL RECORD NUMBER:  [id number]    The patient is being assessed for  Transfer to New Unit    I agree that at least one RN has performed a thorough Head to Toe Skin Assessment on the patient. ALL assessment sites listed below have been assessed.      Areas assessed by both nurses:    Head, Face, Ears, Shoulders, Back, Chest, Arms, Elbows, Hands, Sacrum. Buttock, Coccyx, Ischium, and Legs. Feet and Heels        Does the Patient have a Wound? Yes wound(s) were present on assessment. LDA wound assessment was Initiated and completed by RN       Andrew Prevention initiated by RN: No  Wound Care Orders initiated by RN: No    Pressure Injury (Stage 3,4, Unstageable, DTI, NWPT, and Complex wounds) if present, place Wound referral order by RN under : No    New Ostomies, if present place, Ostomy referral order under : No     Nurse 1 eSignature: Electronically signed by Yan Melendez RN on 8/17/24 at 6:24 PM EDT    **SHARE this note so that the co-signing nurse can place an eSignature**    Nurse 2 eSignature: Electronically signed by Yoana Lewis RN on 8/17/24 at 7:49 PM EDT    
4 Eyes Skin Assessment     NAME:  Ary Munoz  YOB: 1950  MEDICAL RECORD NUMBER:  [id number]    The patient is being assessed for  Admission    I agree that at least one RN has performed a thorough Head to Toe Skin Assessment on the patient. ALL assessment sites listed below have been assessed.      Areas assessed by both nurses:    Head, Face, Ears, Shoulders, Back, Chest, Arms, Elbows, Hands, Sacrum. Buttock, Coccyx, Ischium, Legs. Feet and Heels, and Under Medical Devices         Does the Patient have a Wound? Yes wound(s) were present on assessment. LDA wound assessment was Initiated and completed by RN       Andrew Prevention initiated by RN: Yes  Wound Care Orders initiated by RN: Yes    Pressure Injury (Stage 3,4, Unstageable, DTI, NWPT, and Complex wounds) if present, place Wound referral order by RN under : No    New Ostomies, if present place, Ostomy referral order under : No     Nurse 1 eSignature: Electronically signed by Kamila Dickey RN on 8/12/24 at 12:47 PM EDT    **SHARE this note so that the co-signing nurse can place an eSignature**    Nurse 2 eSignature: Electronically signed by Mary Lackey RN on 8/12/24 at 2:59 PM EDT   
4 Eyes Skin Assessment     NAME:  Ary Munoz  YOB: 1950  MEDICAL RECORD NUMBER:  [id number]    The patient is being assessed for  Other weekly skin assessment    I agree that at least one RN has performed a thorough Head to Toe Skin Assessment on the patient. ALL assessment sites listed below have been assessed.      Areas assessed by both nurses:    Other weekly skin assessment        Does the Patient have a Wound? Yes wound(s) were present on assessment. LDA wound assessment was Initiated and completed by RN       Andrew Prevention initiated by RN: Yes  Wound Care Orders initiated by RN: No    Pressure Injury (Stage 3,4, Unstageable, DTI, NWPT, and Complex wounds) if present, place Wound referral order by RN under : No    New Ostomies, if present place, Ostomy referral order under : No     Nurse 1 eSignature: Electronically signed by Therese Corea RN on 8/14/24 at 2:27 PM EDT    **SHARE this note so that the co-signing nurse can place an eSignature**    Nurse 2 eSignature: Electronically signed by Fawn Leigh RN on 8/14/24 at 2:29 PM EDT    
Echo completed. Report to follow.    
History and Physical    NAME:   Ary Munoz   :  1950   MRN:  [id number]     Date/Time: 2024 12:34 PM    Patient PCP: Gilda Foote MD  ______________________________________________________________________       Subjective:     CHIEF COMPLAINT:   Chest pain/shortness of breath      HISTORY OF PRESENT ILLNESS:     General Daily Progress Note  Patient is a 74 y.o. 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  Pain started substernally but radiates to the right.  She reports shortness of breath.    Patient was on torsemide which was discontinued because of weight loss at the rehab facility patient started having shortness of breath for almost 1 week and for the last 3 days patient on continuous oxygen by nasal cannula To ER workup did not show CHF patient was transferred to our hospital for further workup and treatment while here patient had a CT scan of the chest and was negative for PE but consistent with CHF with elevated BNP patient had 2 L of oxygen by nasal cannula at this time    Patient having some chest pain which shortness of breath but no diaphoresis no palpitation no radiation          HPI- patient is on 3 L nasal cannula    Vitals- /64 pulse 70 temperature 98.2 respirations 22 O2 97%    Labs- glucose 198, BNP 2395,     CT chest without contrast performed () - no pulmonary embolus.  Bilateral pleural effusions, greater on the left    EKG demonstrates ventricular paced rhythm      HPI-patient is alert and awake on 3 L nasal cannula.  She endorses 
History and Physical    NAME:   Ary Munoz   :  1950   MRN:  [id number]     Date/Time: 2024 12:00 PM    Patient PCP: Gilda Foote MD  ______________________________________________________________________       Subjective:     CHIEF COMPLAINT:   Chest pain/shortness of breath      HISTORY OF PRESENT ILLNESS:     General Daily Progress Note  Patient is a 74 y.o. 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  Pain started substernally but radiates to the right.  She reports shortness of breath.    Patient was on torsemide which was discontinued because of weight loss at the rehab facility patient started having shortness of breath for almost 1 week and for the last 3 days patient on continuous oxygen by nasal cannula To ER workup did not show CHF patient was transferred to our hospital for further workup and treatment while here patient had a CT scan of the chest and was negative for PE but consistent with CHF with elevated BNP patient had 2 L of oxygen by nasal cannula at this time    Patient having some chest pain which shortness of breath but no diaphoresis no palpitation no radiation          HPI- patient is on 3 L nasal cannula    Vitals- /64 pulse 70 temperature 98.2 respirations 22 O2 97%    Labs- glucose 198, BNP 2395,     CT chest without contrast performed () - no pulmonary embolus.  Bilateral pleural effusions, greater on the left    EKG demonstrates ventricular paced rhythm      HPI-patient is alert and awake on 3 L nasal cannula.  She endorses 
History and Physical    NAME:   Ary Munoz   :  1950   MRN:  [id number]     Date/Time: 2024 10:32 AM    Patient PCP: Gilda Foote MD  ______________________________________________________________________       Subjective:     CHIEF COMPLAINT:   Chest pain/shortness of breath      HISTORY OF PRESENT ILLNESS:     General Daily Progress Note  Patient is a 74 y.o. 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  Pain started substernally but radiates to the right.  She reports shortness of breath.    Patient was on torsemide which was discontinued because of weight loss at the rehab facility patient started having shortness of breath for almost 1 week and for the last 3 days patient on continuous oxygen by nasal cannula To ER workup did not show CHF patient was transferred to our hospital for further workup and treatment while here patient had a CT scan of the chest and was negative for PE but consistent with CHF with elevated BNP patient had 2 L of oxygen by nasal cannula at this time    Patient having some chest pain which shortness of breath but no diaphoresis no palpitation no radiation          HPI- patient is on 3 L nasal cannula    Vitals- /64 pulse 70 temperature 98.2 respirations 22 O2 97%    Labs- glucose 198, BNP 2395,     CT chest without contrast performed () - no pulmonary embolus.  Bilateral pleural effusions, greater on the left    EKG demonstrates ventricular paced rhythm      HPI-patient is alert and awake on 3 L nasal cannula.  She endorses 
History and Physical    NAME:   Ary Munoz   :  1950   MRN:  [id number]     Date/Time: 2024 10:06 AM    Patient PCP: Gilda Foote MD  ______________________________________________________________________       Subjective:     CHIEF COMPLAINT:   Chest pain/shortness of breath      HISTORY OF PRESENT ILLNESS:     General Daily Progress Note  Patient is a 74 y.o. 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  Pain started substernally but radiates to the right.  She reports shortness of breath.    Patient was on torsemide which was discontinued because of weight loss at the rehab facility patient started having shortness of breath for almost 1 week and for the last 3 days patient on continuous oxygen by nasal cannula To ER workup did not show CHF patient was transferred to our hospital for further workup and treatment while here patient had a CT scan of the chest and was negative for PE but consistent with CHF with elevated BNP patient had 2 L of oxygen by nasal cannula at this time    Patient having some chest pain which shortness of breath but no diaphoresis no palpitation no radiation          HPI- patient is on 3 L nasal cannula    Vitals- /64 pulse 70 temperature 98.2 respirations 22 O2 97%    Labs- glucose 198, BNP 2395,     CT chest without contrast performed () - no pulmonary embolus.  Bilateral pleural effusions, greater on the left    EKG demonstrates ventricular paced rhythm      HPI-patient is alert and awake on 3 L nasal cannula.  She endorses 
History and Physical    NAME:   Ary Munoz   :  1950   MRN:  [id number]     Date/Time: 2024 9:39 AM    Patient PCP: Gilda Foote MD  ______________________________________________________________________       Subjective:     CHIEF COMPLAINT:   Chest pain/shortness of breath      HISTORY OF PRESENT ILLNESS:     General Daily Progress Note  Patient is a 74 y.o. 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  Pain started substernally but radiates to the right.  She reports shortness of breath.    Patient was on torsemide which was discontinued because of weight loss at the rehab facility patient started having shortness of breath for almost 1 week and for the last 3 days patient on continuous oxygen by nasal cannula To ER workup did not show CHF patient was transferred to our hospital for further workup and treatment while here patient had a CT scan of the chest and was negative for PE but consistent with CHF with elevated BNP patient had 2 L of oxygen by nasal cannula at this time    Patient having some chest pain which shortness of breath but no diaphoresis no palpitation no radiation          HPI- patient is on 3 L nasal cannula    Vitals- /64 pulse 70 temperature 98.2 respirations 22 O2 97%    Labs- glucose 198, BNP 2395,     CT chest without contrast performed () - no pulmonary embolus.  Bilateral pleural effusions, greater on the left    EKG demonstrates ventricular paced rhythm      HPI-patient is alert and awake on 3 L nasal cannula.  She endorses 
Midline Insertion Procedure Note    Procedure: Insertion of #4 FR/18G Midline    Indications:  Poor Access    Procedure Details   Informed consent was obtained for the procedure, including sedation.  Risks of  hemorrhage, and adverse drug reaction were discussed.     #4 FR/18G Midline inserted to the R Cephalic vein per hospital protocol.   Blood return:  yes    Findings:  Catheter inserted to 14 cm, with 0 cm exposed. Mid upper arm circumference is 38 cm.  Catheter was flushed with 20 cc NS. Patient did tolerate procedure well.    Recommendations:   Brochure given to patient with teaching instruction.  
Midline removed with tip intact.   
OCCUPATIONAL THERAPY TREATMENT  Patient: Ary Munoz (74 y.o. female)  Date: 8/15/2024  Primary Diagnosis: Hypoxemia [R09.02]  CHF with unknown LVEF ([facility]) [I50.9]  CHF (congestive heart failure), NYHA class I, acute on chronic, combined ([facility]) [I50.43]  Chest pain, unspecified type [R07.9]  Acute on chronic congestive heart failure, unspecified heart failure type (HCC) [I50.9]       Precautions: Bed Alarm, Fall Risk, Weight Bearing   Left Lower Extremity Weight Bearing: Non Weight Bearing            Recommendations for nursing mobility: Encourage HEP in prep for ADLs/mobility; see handout for details, Frequent repositioning to prevent skin breakdown, Pankaj Stedy for bed to chair transfers , and Assist x2    In place during session: Nasal Cannula 3L, External Catheter, and EKG/telemetry   Chart, occupational therapy assessment, plan of care, and goals were reviewed.  ASSESSMENT  Patient continues with skilled OT services and is progressing towards goals. Pt semi supine in bed upon PERDOMO arrival, agreeable to session. Pt A&O x 4. Pt required encouragement during session. Improvements noted in bed mobility requiring less assistance.  Pt completed combing of hair w/ set up w/ good static/ dyn balance and completed UE therex, see grid below for details, to maintain/ increase strength and endurance to aid in adl performance. Pt completed several sit to stands w/ rw and Mod Ax 2. T/f to recliner attempted w/ pankaj stedy however unable to fit pankaj stedy under or around current recliner for safe t/f.  Pt returned to eob. All known needs met.  (See below for objective details and assist levels).     Overall pt tolerated session fair today with rest breaks. Potential barriers for safe discharge: pts current support system is unable to meet their requirements for physical assistance, pt is a high fall risk, pt is not safe to be alone, and concern for pt safely navigating or managing the home environment. Current OT 
OCCUPATIONAL THERAPY TREATMENT  Patient: Ary Munoz (74 y.o. female)  Date: 8/16/2024  Primary Diagnosis: Hypoxemia [R09.02]  CHF with unknown LVEF (HCC) [I50.9]  CHF (congestive heart failure), NYHA class I, acute on chronic, combined ([facility]) [I50.43]  Chest pain, unspecified type [R07.9]  Acute on chronic congestive heart failure, unspecified heart failure type (HCC) [I50.9]       Precautions: Bed Alarm, Fall Risk, Weight Bearing   Left Lower Extremity Weight Bearing: Non Weight Bearing            Recommendations for nursing mobility: Encourage HEP in prep for ADLs/mobility; see handout for details, Frequent repositioning to prevent skin breakdown, Assist x1, and Assist x2    In place during session: Nasal Cannula 3L, External Catheter, and EKG/telemetry   Chart, occupational therapy assessment, plan of care, and goals were reviewed.    ASSESSMENT  Patient continues with skilled OT services and is progressing towards goals.  Pt received semi-supine in bed upon arrival, AXO x 4 and agreeable to PERDOMO tx at this time. Pt cooperative and demonstrated fair effort during activities with additional time d/t decreased activity tolerance.  Pt req'd min/mod A for bed mobility>EOB with verbal/manual/visual cues for proper technique. Pt educated on proper O2 levels as pt unaware. EOB, pt req'd set-up for simple grooming d/t decreased functional mobility. PTA arrived for STS tf using AD with demonstration/verbal/tactile/manual cues for correct hand/feet placement, proper technique and RW mgmt for safety. Pt IND for NWB precautions. With first attempt to stand pt unable to clear bed, with second STS pt able to clear bed with max A x2 when pt used R UE to push from bed, standing for 5 seconds. Pt engaged in lindsay UE exercises with education and cueing provided regarding joint protection/proper technique/achieve full ROM needed to maintain/improve strength to increase performance in ADL'S and functional tf's, see grid below. Pt 
OCCUPATIONAL THERAPY TREATMENT  Patient: Ary Munoz (74 y.o. female)  Date: 8/19/2024  Primary Diagnosis: Hypoxemia [R09.02]  CHF with unknown LVEF ([facility]) [I50.9]  CHF (congestive heart failure), NYHA class I, acute on chronic, combined ([facility]) [I50.43]  Chest pain, unspecified type [R07.9]  Acute on chronic congestive heart failure, unspecified heart failure type ([facility]) [I50.9]       Precautions: Bed Alarm, Fall Risk, Weight Bearing   Left Lower Extremity Weight Bearing: Non Weight Bearing            Recommendations for nursing mobility: Encourage HEP in prep for ADLs/mobility; see handout for details, Frequent repositioning to prevent skin breakdown, Use of bed/chair alarm for safety, Assist x2, and NWB L LE    In place during session: Nasal Cannula 2L and External Catheter  Chart, occupational therapy assessment, plan of care, and goals were reviewed.  ASSESSMENT  Patient continues with skilled OT services and is slowly progressing towards goals. Pt presented seimi supine in bed upon PERDOMO arrival, agreeable to session. Pt A&O x 3. Pt oriented to month.  Pt at first not oriented to situation, but later was able to state situation. Pt declined any hygiene stating she wasn't ready for it yet. Pt doffed hospital gown while semi supine in bed with verbal cues.  Pt again changed hospital gown (into a larger gown) while seated EOB.  Pt required assistance to pull to and over shoulders.  Pt completed her own exercises ~3 times each.  Therapist educated pt on exercises to assist with edema management for B UE.  Pt demonstrated understanding.  Pt attempted two sit to stands unable to get upright with either stand.  Activity completed in prep for BSC transfers. Pt returned supine and left with PT. (See below for objective details and assist levels).     Overall pt tolerated session fair today with improvement in bed mobility. Pt continues to benefit from skilled OT to increase strength, endurance, increase independence with 
PT screen completed as per IDRs pt failed alexandria's egress and also with high PA fall score.  Pt may benefit from PT orders when/if medically appropriate.  Thank you.    
PULMONARY NOTE  [facility]    Name: Ary Munoz MRN: [id number]   : 1950 Hospital: [facility]   Date: 2024  Admission date: 2024 Hospital Day: 7       HPI:     Patient Active Problem List   Diagnosis    CHF with unknown LVEF (HCC)    CHF (congestive heart failure), NYHA class I, acute on chronic, combined (HCC)             [x] High complexity decision making was performed  [x] See my orders for details      Subjective/Initial History:     I was asked by Gilda Foote MD to see Ary Munoz  a 74 y.o.   female in consultation     Excerpts from admission 2024 or consult notes as follows:   74-year-old lady came in because of chest pain shortness of breath history of DVT obstructive sleep apnea on CPAP and 2 L nasal cannula diabetes mellitus CHF pacemaker in place she has breast cancer.  Port left partial mastectomy s/p radiation treatment also has A-fib she was complaining about shortness of breath and dyspnea BNP was elevated she was put on oxygen via nasal cannula so admitted and pulmonary consult was called      Allergies   Allergen Reactions    Sulfa Antibiotics Hives    Amiodarone Other (See Comments)        MAR reviewed and pertinent medications noted or modified as needed     Current Facility-Administered Medications   Medication Dose Route Frequency Provider Last Rate Last Admin    insulin NPH (HumuLIN N;NovoLIN N) injection vial 24 Units  24 Units SubCUTAneous BID AC Gilda Foote MD   24 Units at 24 0622    cefTRIAXone (ROCEPHIN) 1,000 mg in sterile water 10 mL IV syringe  1,000 mg IntraVENous Q24H Gilda Foote MD   1,000 mg at 24    bumetanide (BUMEX) injection 1 mg  1 mg IntraVENous BID Gilda Foote MD   1 mg at 24    latanoprost (XALATAN) 0.005 % ophthalmic solution 1 drop  1 drop Both Eyes Nightly Gilda Foote MD   1 drop at 24    diphenhydrAMINE (BENADRYL) capsule 50 mg  50 mg Oral Q6H 
PULMONARY NOTE  [facility]    Name: Ary Munoz MRN: [id number]   : 1950 Hospital: [facility]   Date: 8/15/2024  Admission date: 2024 Hospital Day: 4       HPI:     Patient Active Problem List   Diagnosis    CHF with unknown LVEF (HCC)    CHF (congestive heart failure), NYHA class I, acute on chronic, combined (HCC)             [x] High complexity decision making was performed  [x] See my orders for details      Subjective/Initial History:     I was asked by Gilda Foote MD to see Ary Munoz  a 74 y.o.   female in consultation     Excerpts from admission 2024 or consult notes as follows:   74-year-old lady came in because of chest pain shortness of breath history of DVT obstructive sleep apnea on CPAP and 2 L nasal cannula diabetes mellitus CHF pacemaker in place she has breast cancer.  Port left partial mastectomy s/p radiation treatment also has A-fib she was complaining about shortness of breath and dyspnea BNP was elevated she was put on oxygen via nasal cannula so admitted and pulmonary consult was called      Allergies   Allergen Reactions    Sulfa Antibiotics Hives    Amiodarone Other (See Comments)        MAR reviewed and pertinent medications noted or modified as needed     Current Facility-Administered Medications   Medication Dose Route Frequency Provider Last Rate Last Admin    bumetanide (BUMEX) injection 1 mg  1 mg IntraVENous BID Gilda Foote MD   1 mg at 08/15/24 0911    latanoprost (XALATAN) 0.005 % ophthalmic solution 1 drop  1 drop Both Eyes Nightly Gilda Foote MD   1 drop at 24    diphenhydrAMINE (BENADRYL) capsule 50 mg  50 mg Oral Q6H PRN Gilda Foote MD        hydrocortisone 1 % cream   Topical TID Gilda Foote MD        insulin lispro (HUMALOG,ADMELOG) injection vial 0-16 Units  0-16 Units SubCUTAneous TID WC Gilda Foote MD   8 Units at 08/15/24 0912    insulin lispro (HUMALOG,ADMELOG) injection 
PULMONARY NOTE  [facility]    Name: Ary Munoz MRN: [id number]   : 1950 Hospital: [facility]   Date: 2024  Admission date: 2024 Hospital Day: 8       HPI:     Patient Active Problem List   Diagnosis    CHF with unknown LVEF (HCC)    CHF (congestive heart failure), NYHA class I, acute on chronic, combined (HCC)             [x] High complexity decision making was performed  [x] See my orders for details      Subjective/Initial History:     I was asked by Gilda Foote MD to see Ary Munoz  a 74 y.o.   female in consultation     Excerpts from admission 2024 or consult notes as follows:   74-year-old lady came in because of chest pain shortness of breath history of DVT obstructive sleep apnea on CPAP and 2 L nasal cannula diabetes mellitus CHF pacemaker in place she has breast cancer.  Port left partial mastectomy s/p radiation treatment also has A-fib she was complaining about shortness of breath and dyspnea BNP was elevated she was put on oxygen via nasal cannula so admitted and pulmonary consult was called      Allergies   Allergen Reactions    Sulfa Antibiotics Hives    Amiodarone Other (See Comments)        MAR reviewed and pertinent medications noted or modified as needed     Current Facility-Administered Medications   Medication Dose Route Frequency Provider Last Rate Last Admin    bumetanide (BUMEX) tablet 1 mg  1 mg Oral Daily Gilda Foote MD   1 mg at 24 1238    insulin NPH (HumuLIN N;NovoLIN N) injection vial 24 Units  24 Units SubCUTAneous BID AC Gilda Foote MD   24 Units at 24 0631    cefTRIAXone (ROCEPHIN) 1,000 mg in sterile water 10 mL IV syringe  1,000 mg IntraVENous Q24H Gilda Foote MD   1,000 mg at 248    latanoprost (XALATAN) 0.005 % ophthalmic solution 1 drop  1 drop Both Eyes Nightly Gilda Foote MD   1 drop at 24    diphenhydrAMINE (BENADRYL) capsule 50 mg  50 mg Oral Q6H PRN 
PULMONARY PROGRESS  [facility]    Name: Ary Munoz MRN: [id number]   : 1950 Hospital: [facility]   Date: 2024  Admission date: 2024 Hospital Day: 2       HPI:     Patient Active Problem List   Diagnosis    CHF with unknown LVEF (HCC)    CHF (congestive heart failure), NYHA class I, acute on chronic, combined (HCC)             [x] High complexity decision making was performed  [x] See my orders for details      Subjective/Initial History:     I was asked by Gilda Foote MD to see Ary Munoz  a 74 y.o.   female in consultation     Excerpts from admission 2024 or consult notes as follows:   74-year-old lady came in because of chest pain shortness of breath history of DVT obstructive sleep apnea on CPAP and 2 L nasal cannula diabetes mellitus CHF pacemaker in place she has breast cancer.  Port left partial mastectomy s/p radiation treatment also has A-fib she was complaining about shortness of breath and dyspnea BNP was elevated she was put on oxygen via nasal cannula so admitted and pulmonary consult was called      Allergies   Allergen Reactions    Sulfa Antibiotics Hives    Amiodarone Other (See Comments)        MAR reviewed and pertinent medications noted or modified as needed     Current Facility-Administered Medications   Medication Dose Route Frequency Provider Last Rate Last Admin    warfarin (COUMADIN) tablet 3 mg  3 mg Oral Once Gilda Foote MD        insulin lispro (HUMALOG,ADMELOG) injection vial 0-16 Units  0-16 Units SubCUTAneous TID  Gilda Foote MD   4 Units at 24 0842    insulin lispro (HUMALOG,ADMELOG) injection vial 0-4 Units  0-4 Units SubCUTAneous Nightly Gilda Foote MD        glucose chewable tablet 16 g  4 tablet Oral PRN Gilda Foote MD        dextrose bolus 10% 125 mL  125 mL IntraVENous PRN Gilda Foote MD        Or    dextrose bolus 10% 250 mL  250 mL IntraVENous PRGilda Del Valle MD        
PULMONARY PROGRESS  [facility]    Name: Ary Munoz MRN: [id number]   : 1950 Hospital: [facility]   Date: 2024  Admission date: 2024 Hospital Day: 3       HPI:     Patient Active Problem List   Diagnosis    CHF with unknown LVEF (HCC)    CHF (congestive heart failure), NYHA class I, acute on chronic, combined (HCC)             [x] High complexity decision making was performed  [x] See my orders for details      Subjective/Initial History:     I was asked by Gilda Foote MD to see Ary Munoz  a 74 y.o.   female in consultation     Excerpts from admission 2024 or consult notes as follows:   74-year-old lady came in because of chest pain shortness of breath history of DVT obstructive sleep apnea on CPAP and 2 L nasal cannula diabetes mellitus CHF pacemaker in place she has breast cancer.  Port left partial mastectomy s/p radiation treatment also has A-fib she was complaining about shortness of breath and dyspnea BNP was elevated she was put on oxygen via nasal cannula so admitted and pulmonary consult was called      Allergies   Allergen Reactions    Sulfa Antibiotics Hives    Amiodarone Other (See Comments)        MAR reviewed and pertinent medications noted or modified as needed     Current Facility-Administered Medications   Medication Dose Route Frequency Provider Last Rate Last Admin    insulin lispro (HUMALOG,ADMELOG) injection vial 0-16 Units  0-16 Units SubCUTAneous TID WC Gilda Foote MD   8 Units at 24 1735    insulin lispro (HUMALOG,ADMELOG) injection vial 0-4 Units  0-4 Units SubCUTAneous Nightly Gilda Foote MD   4 Units at 24    glucose chewable tablet 16 g  4 tablet Oral PRN Gilda Foote MD        dextrose bolus 10% 125 mL  125 mL IntraVENous PRN Gilda Foote MD        Or    dextrose bolus 10% 250 mL  250 mL IntraVENous PRN Gilda Foote MD        glucagon injection 1 mg  1 mg SubCUTAneous PRN 
Patient's Blood sugar was 436 , Doctor Qamar informed, advised to give the Humalog 16 units as per precribed and recheck BS after 1 hour.    Blood sugar rechecked was 447, Doctor Qamar was informed, stat dose of Humalog given as per order, to recheck Blood sugar after 1 hour, will handoff to oncoming night shift nurse.  
Warfarin Dosing Consult  Ary Munoz is a 74 y.o. female with Afib and pacemaker placement along with history of DVT. Pharmacy was consulted by Dr. Gilda Foote to dose and monitor warfarin.    INR Goal: 2-3    PTA Dose:   -Mon/Wed/Fri/Sun: 2 mg  -Tues/Thur/Sat: 3 mg      Drugs that may increase INR:None  Drugs that may decrease INR: None  Other current anticoagulants/ drugs that may increase bleeding risk: None  Risk factors: Age > 65 and Decompensated Heart Failure  Daily INR ordered: Yes    Recent Labs     08/16/24  0412 08/17/24  0712 08/18/24  0544   HGB 12.6 13.2 13.2   * 393 400     Recent Labs     08/16/24 0412 08/17/24  0712 08/18/24  0544   ALT 21 21 20   AST 22 20 17     Recent Labs     08/16/24 0412 08/17/24  0712 08/18/24  0544   INR 1.3* 1.4* 1.7*       Date INR Previous Dose   8/12/24 1.2 N/A   8/13 1.3 3mg   8/14 1.4 3mg   8/15 1.3 3mg   8/16 1.3 5 mg   8/17 1.4 5 mg    8/18 1.7 6 mg     Assessment/ Plan:  Patient presented to the ED with chest pain and shortness of breath consistent with heart failure exacerbation. Pertinent medical history of DVT of RLE, and Afib with pacemaker placement. H/H & platelets remain stable.  No significant DDIs to note.   INR is up-trending however still subtherapeutic at 1.7  Continue warfarin 6 mg x 1 dose (8/18/24) @1800.  Subsequent INR scheduled through 8/23/24 @0600.  Pharmacy will continue to monitor daily and adjust therapy as indicated.                         
Warfarin Dosing Consult  Ary Munoz is a 74 y.o. female with Afib and pacemaker placement. Pharmacy was consulted by Dr. Gilda Foote to dose and monitor warfarin.    INR Goal: 2-3    PTA Dose:   -Mon/Wed/Fri/Sun: 2 mg  -Tues/Thur/Sat: 3 mg      Drugs that may increase INR:None  Drugs that may decrease INR: None  Other current anticoagulants/ drugs that may increase bleeding risk: None  Risk factors: Age > 65 and Decompensated Heart Failure  Daily INR ordered: Yes    Recent Labs     08/12/24  0410   HGB 12.8        Recent Labs     08/12/24  0410   ALT 16   AST 32     Recent Labs     08/12/24  1330   INR 1.2*       Date INR Previous Dose   8/12/24 1.2 N/A     Assessment/ Plan:  Patient presented to the ED with chest pain and shortness of breath consistent with heart failure exacerbation. Pertinent medical history of DVT of RLE, and Afib with pacemaker placement. The patients hgb/plt appear stable with no active signs of bleeding.   Current INR goal is 2-3 however INR upon admission is 1.2.   Plan for warfarin 3 mg PO x 1 dose.  Subsequent INR scheduled through 8/19/24  Pharmacy will continue to monitor daily and adjust therapy as indicated.           
Warfarin Dosing Consult  Ary Munoz is a 74 y.o. female with Afib and pacemaker placement. Pharmacy was consulted by Dr. Gilda Foote to dose and monitor warfarin.    INR Goal: 2-3    PTA Dose:   -Mon/Wed/Fri/Sun: 2 mg  -Tues/Thur/Sat: 3 mg      Drugs that may increase INR:None  Drugs that may decrease INR: None  Other current anticoagulants/ drugs that may increase bleeding risk: None  Risk factors: Age > 65 and Decompensated Heart Failure  Daily INR ordered: Yes    Recent Labs     08/12/24  0410 08/13/24  0620   HGB 12.8 11.9    350     Recent Labs     08/12/24  0410 08/13/24  0620   ALT 16 18   AST 32 20     Recent Labs     08/12/24  1330 08/13/24  0620   INR 1.2* 1.3*       Date INR Previous Dose   8/12/24 1.2 N/A     Assessment/ Plan:  Patient presented to the ED with chest pain and shortness of breath consistent with heart failure exacerbation. Pertinent medical history of DVT of RLE, and Afib with pacemaker placement. The patients hgb/plt appear stable with no active signs of bleeding.   INR 1.3.  Continue warfarin 3 mg x 1 dose  Subsequent INR scheduled through 8/19/24  Pharmacy will continue to monitor daily and adjust therapy as indicated.             
Warfarin Dosing Consult  Ary Munoz is a 74 y.o. female with Afib and pacemaker placement. Pharmacy was consulted by Dr. Gilda Foote to dose and monitor warfarin.    INR Goal: 2-3    PTA Dose:   -Mon/Wed/Fri/Sun: 2 mg  -Tues/Thur/Sat: 3 mg      Drugs that may increase INR:None  Drugs that may decrease INR: None  Other current anticoagulants/ drugs that may increase bleeding risk: None  Risk factors: Age > 65 and Decompensated Heart Failure  Daily INR ordered: Yes    Recent Labs     08/12/24  0410 08/13/24  0620 08/14/24  0236   HGB 12.8 11.9 12.9    350 399     Recent Labs     08/12/24  0410 08/13/24  0620 08/14/24  0236   ALT 16 18 22   AST 32 20 26     Recent Labs     08/12/24  1330 08/13/24  0620 08/14/24  0621   INR 1.2* 1.3* 1.4*       Date INR Previous Dose   8/12/24 1.2 N/A     Assessment/ Plan:  Patient presented to the ED with chest pain and shortness of breath consistent with heart failure exacerbation. Pertinent medical history of DVT of RLE, and Afib with pacemaker placement. INR on admission was 1.2, up to 1.4 today after 2 doses thus far. Given recent ORIF of the L ankle on 8/5, likely was not restarted or fully titrated back on warfarin.  H/H & platelets remain stable  No significant DDIs to note  Continue warfarin 3mg x 1 dose tonight; may consider increasing dose to 4 or 5mg on 8/15 depending on response in INR.   Subsequent INR scheduled through 8/19/24  Pharmacy will continue to monitor daily and adjust therapy as indicated.               
Warfarin Dosing Consult  Ary Munoz is a 74 y.o. female with Afib and pacemaker placement. Pharmacy was consulted by Dr. Gilda Foote to dose and monitor warfarin.    INR Goal: 2-3    PTA Dose:   -Mon/Wed/Fri/Sun: 2 mg  -Tues/Thur/Sat: 3 mg      Drugs that may increase INR:None  Drugs that may decrease INR: None  Other current anticoagulants/ drugs that may increase bleeding risk: None  Risk factors: Age > 65 and Decompensated Heart Failure  Daily INR ordered: Yes    Recent Labs     08/13/24  0620 08/14/24  0236 08/15/24  0605   HGB 11.9 12.9 12.7    399 401*     Recent Labs     08/13/24  0620 08/14/24  0236 08/15/24  0605   ALT 18 22 20   AST 20 26 19     Recent Labs     08/12/24  1330 08/13/24  0620 08/14/24  0621 08/15/24  0605   INR 1.2* 1.3* 1.4* 1.3*       Date INR Previous Dose   8/12/24 1.2 N/A   8/13 1.3 3mg   8/14 1.4 3mg   8/15 1.3 3mg          Assessment/ Plan:  Patient presented to the ED with chest pain and shortness of breath consistent with heart failure exacerbation. Pertinent medical history of DVT of RLE, and Afib with pacemaker placement. INR on admission was 1.2, up to 1.4 today after 2 doses thus far. Given recent ORIF of the L ankle on 8/5, likely was not restarted or fully titrated back on warfarin.  H/H & platelets remain stable  No significant DDIs to note  INR subtherapeutic at 1.3 today. decreased from yesterday.   increase warfarin to 5mg x 1 dose tonight;   Subsequent INR scheduled through 8/19/24  Pharmacy will continue to monitor daily and adjust therapy as indicated.                 
Warfarin Dosing Consult  Ary Munoz is a 74 y.o. female with Afib and pacemaker placement. Pharmacy was consulted by Dr. Gilda Foote to dose and monitor warfarin.    INR Goal: 2-3    PTA Dose:   -Mon/Wed/Fri/Sun: 2 mg  -Tues/Thur/Sat: 3 mg      Drugs that may increase INR:None  Drugs that may decrease INR: None  Other current anticoagulants/ drugs that may increase bleeding risk: None  Risk factors: Age > 65 and Decompensated Heart Failure  Daily INR ordered: Yes    Recent Labs     08/14/24  0236 08/15/24  0605 08/16/24  0412   HGB 12.9 12.7 12.6    401* 412*     Recent Labs     08/14/24  0236 08/15/24  0605 08/16/24  0412   ALT 22 20 21   AST 26 19 22     Recent Labs     08/14/24  0621 08/15/24  0605 08/16/24  0412   INR 1.4* 1.3* 1.3*       Date INR Previous Dose   8/12/24 1.2 N/A   8/13 1.3 3mg   8/14 1.4 3mg   8/15 1.3 3mg   8/16 1.3 5 mg     Assessment/ Plan:  Patient presented to the ED with chest pain and shortness of breath consistent with heart failure exacerbation. Pertinent medical history of DVT of RLE, and Afib with pacemaker placement. H/H & platelets remain stable.  No significant DDIs to note  INR subtherapeutic and remains at 1.3.  Continue warfarin to 5mg x 1 dose tonight;  If no improvement tomorrow, recommend increasing dose again.  Subsequent INR scheduled through 8/19/24  Pharmacy will continue to monitor daily and adjust therapy as indicated.                   
Warfarin Dosing Consult  Ary Munoz is a 74 y.o. female with Afib and pacemaker placement. Pharmacy was consulted by Dr. Gilda Foote to dose and monitor warfarin.    INR Goal: 2-3    PTA Dose:   -Mon/Wed/Fri/Sun: 2 mg  -Tues/Thur/Sat: 3 mg      Drugs that may increase INR:None  Drugs that may decrease INR: None  Other current anticoagulants/ drugs that may increase bleeding risk: None  Risk factors: Age > 65 and Decompensated Heart Failure  Daily INR ordered: Yes    Recent Labs     08/15/24  0605 08/16/24  0412 08/17/24  0712   HGB 12.7 12.6 13.2   * 412* 393     Recent Labs     08/15/24  0605 08/16/24 0412 08/17/24  0712   ALT 20 21 21   AST 19 22 20     Recent Labs     08/15/24  0605 08/16/24 0412 08/17/24  0712   INR 1.3* 1.3* 1.4*       Date INR Previous Dose   8/12/24 1.2 N/A   8/13 1.3 3mg   8/14 1.4 3mg   8/15 1.3 3mg   8/16 1.3 5 mg   8/17 1.4 5 mg      Assessment/ Plan:  Patient presented to the ED with chest pain and shortness of breath consistent with heart failure exacerbation. Pertinent medical history of DVT of RLE, and Afib with pacemaker placement. H/H & platelets remain stable.  No significant DDIs to note.   INR remains subtherapeutic at 1.4  Increasing warfarin to 6 mg x 1 dose (8/17/24)  Subsequent INR scheduled through 8/19/24  Pharmacy will continue to monitor daily and adjust therapy as indicated.                       
Warfarin Dosing Consult  Ary Munoz is a 74 y.o. female with Afib and pacemaker placement. Pharmacy was consulted by Dr. Gilda Foote to dose and monitor warfarin.    INR Goal: 2-3    PTA Dose:   -Mon/Wed/Fri/Sun: 2 mg  -Tues/Thur/Sat: 3 mg      Drugs that may increase INR:None  Drugs that may decrease INR: None  Other current anticoagulants/ drugs that may increase bleeding risk: None  Risk factors: Age > 65 and Decompensated Heart Failure  Daily INR ordered: Yes    Recent Labs     08/15/24  0605 08/16/24 0412 08/17/24  0712   HGB 12.7 12.6 13.2   * 412* 393     Recent Labs     08/15/24  0605 08/16/24 0412 08/17/24  0712   ALT 20 21 21   AST 19 22 20     Recent Labs     08/15/24  0605 08/16/24 0412 08/17/24  0712   INR 1.3* 1.3* 1.4*       Date INR Previous Dose   8/12/24 1.2 N/A   8/13 1.3 3mg   8/14 1.4 3mg   8/15 1.3 3mg   8/16 1.3 5 mg   8/17 1.4 6 mg      Assessment/ Plan:  Patient presented to the ED with chest pain and shortness of breath consistent with heart failure exacerbation. Pertinent medical history of DVT of RLE, and Afib with pacemaker placement. H/H & platelets remain stable.  No significant DDIs to note.   INR remains subtherapeutic at 1.4  Increasing warfarin to 6 mg x 1 dose (8/17/24)  Subsequent INR scheduled through 8/19/24  Pharmacy will continue to monitor daily and adjust therapy as indicated.                     
0910    ezetimibe (ZETIA) tablet 10 mg  10 mg Oral Nightly Gilda Foote MD   10 mg at 08/16/24 2106    potassium chloride (KLOR-CON) extended release tablet 10 mEq  10 mEq Oral Daily Gilda Foote MD   10 mEq at 08/16/24 0910    atorvastatin (LIPITOR) tablet 40 mg  40 mg Oral Nightly Gilda Foote MD   40 mg at 08/16/24 2107    insulin NPH (HumuLIN N;NovoLIN N) injection vial 24 Units  24 Units SubCUTAneous QAM Gilda Foote MD   24 Units at 08/16/24 0911    letrozole (FEMARA) tablet 2.5 mg  2.5 mg Oral Daily Gilda Foote MD   2.5 mg at 08/16/24 0910    digoxin (LANOXIN) tablet 125 mcg  125 mcg Oral Daily Gilda Foote MD   125 mcg at 08/16/24 0910    acetaminophen (TYLENOL) tablet 1,000 mg  1,000 mg Oral 3 times per day Dina Arellano PA-C   1,000 mg at 08/16/24 2109    traMADol (ULTRAM) tablet 50 mg  50 mg Oral Q6H PRN Dina Arellano PA-C   50 mg at 08/16/24 2106    warfarin placeholder: dosing by pharmacy   Other RX Placeholder Gilda Foote MD          Patient PCP: Gilda Foote MD  PMH:  has a past medical history of Acute deep vein thrombosis (DVT) of right lower extremity (HCC), Cataract, Cervical radiculopathy, Chronic diastolic congestive heart failure (HCC), Chronic pain syndrome, Displaced trimalleolar fracture of right lower leg, sequela, Encounter for other orthopedic aftercare, Hyperlipidemia, unspecified, Idiopathic chronic gout, unspecified site, with tophus (tophi), Insomnia, Low back pain, Night sweats, Obstructive sleep apnea (adult) (pediatric), Other retention of urine, Pain of left hip joint, Recurrent UTI, Sick sinus syndrome (HCC), Syncope, Thyroid nodule, Tinnitus, Tubular adenoma of colon, Type 2 diabetes mellitus with unspecified complications (HCC), Unspecified atrial fibrillation (HCC), Unspecified atrioventricular block, and Unspecified type of carcinoma in situ of left breast.  PSH:   has a past surgical history that includes Hysterectomy and 
SOLN injection vial Inject 24 Units into the skin 3 times daily (before meals) Sliding scale   Yes Chase Garrido MD   hydrocortisone 1 % cream Apply topically every morning Apply to scaly lesions   Yes Chase Garrido MD   insulin glargine (LANTUS) 100 UNIT/ML injection vial Inject 30 Units into the skin every morning   Yes Chase Garrido MD   latanoprost (XALATAN) 0.005 % ophthalmic solution Place 1 drop into both eyes nightly   Yes Chase Garrido MD   letrozole (FEMARA) 2.5 MG tablet Take 1 tablet by mouth daily   Yes Chase Garrido MD   melatonin 5 MG TABS tablet Take 1 tablet by mouth nightly   Yes Chase Garrido MD   metoprolol tartrate (LOPRESSOR) 50 MG tablet Take 1 tablet by mouth 2 times daily   Yes Chase Garrido MD   miconazole (MICOTIN) 2 % vaginal cream Place 1 applicator vaginally nightly Place vaginally nightly.for candidiasis   Yes Chase Garrido MD   semaglutide, 2 MG/DOSE, (OZEMPIC, 2 MG/DOSE,) 8 MG/3ML SOPN sc injection Inject 2 mg into the skin every 7 days Every Monday   Yes Chase Garrido MD   polyethylene glycol (GLYCOLAX) 17 GM/SCOOP powder Take 17 g by mouth 2 times daily   Yes Chase Garrido MD   carboxymethylcellulose 1 % ophthalmic solution Place 1 drop into both eyes 2 times daily   Yes Chase Garrido MD   senna (SENOKOT) 8.6 MG tablet Take 2 tablets by mouth nightly   Yes Chase Garrido MD   traMADol (ULTRAM) 50 MG tablet Take 1 tablet by mouth every 6 hours as needed for Pain.   Yes Chase Garrido MD   acetaminophen (TYLENOL) 500 MG tablet Take 2 tablets by mouth every 8 hours as needed for Pain   Yes Chase Garrido MD   Cholecalciferol (VITAMIN D3) 50 MCG (2000 UT) CAPS Take 1 capsule by mouth Daily   Yes Chase Garrido MD   warfarin (COUMADIN) 2 MG tablet Take 1 tablet by mouth In the evening Mon. Wed. Fri. Sun for Treating/preventing blood clots.   Yes Chase Garrido MD 
\"CKNDX\", \"TROIQ\"  No results found for: \"CHOL\", \"CHLST\", \"CHOLV\", \"HDL\", \"HDLC\", \"LDL\"    Recent Labs     08/16/24  0412 08/17/24  0712 08/18/24  0544   GLOB 3.6 3.5 3.6     No results for input(s): \"PH\", \"PCO2\", \"PO2\" in the last 72 hours.    Medications Personally Reviewed:    Current Facility-Administered Medications   Medication Dose Route Frequency    bumetanide (BUMEX) tablet 1 mg  1 mg Oral Daily    potassium chloride (KLOR-CON) extended release tablet 40 mEq  40 mEq Oral Once    warfarin (COUMADIN) tablet 6 mg  6 mg Oral Once    insulin NPH (HumuLIN N;NovoLIN N) injection vial 24 Units  24 Units SubCUTAneous BID AC    cefTRIAXone (ROCEPHIN) 1,000 mg in sterile water 10 mL IV syringe  1,000 mg IntraVENous Q24H    latanoprost (XALATAN) 0.005 % ophthalmic solution 1 drop  1 drop Both Eyes Nightly    diphenhydrAMINE (BENADRYL) capsule 50 mg  50 mg Oral Q6H PRN    hydrocortisone 1 % cream   Topical TID    insulin lispro (HUMALOG,ADMELOG) injection vial 0-16 Units  0-16 Units SubCUTAneous TID WC    insulin lispro (HUMALOG,ADMELOG) injection vial 0-4 Units  0-4 Units SubCUTAneous Nightly    glucose chewable tablet 16 g  4 tablet Oral PRN    dextrose bolus 10% 125 mL  125 mL IntraVENous PRN    Or    dextrose bolus 10% 250 mL  250 mL IntraVENous PRN    glucagon injection 1 mg  1 mg SubCUTAneous PRN    dextrose 10 % infusion   IntraVENous Continuous PRN    sodium chloride flush 0.9 % injection 5-40 mL  5-40 mL IntraVENous 2 times per day    sodium chloride flush 0.9 % injection 5-40 mL  5-40 mL IntraVENous PRN    0.9 % sodium chloride infusion   IntraVENous PRN    potassium chloride (KLOR-CON M) extended release tablet 40 mEq  40 mEq Oral PRN    Or    potassium bicarb-citric acid (EFFER-K) effervescent tablet 40 mEq  40 mEq Oral PRN    Or    potassium chloride 10 mEq/100 mL IVPB (Peripheral Line)  10 mEq IntraVENous PRN    magnesium sulfate 2000 mg in 50 mL IVPB premix  2,000 mg IntraVENous PRN    ondansetron 
pacemaker placement.   FHX: family history is not on file.   SHX:  reports that she has never smoked. She has never used smokeless tobacco. She reports that she does not drink alcohol and does not use drugs.     ROS:    Review of Systems   Constitutional: Negative.    HENT: Negative.     Respiratory:  Positive for shortness of breath.    Musculoskeletal: Negative.    Neurological: Negative.           Objective:     Vital Signs: Telemetry:    normal sinus rhythm Intake/Output:   BP (!) 106/53   Pulse 72   Temp 97.9 °F (36.6 °C)   Resp 18   Ht 1.549 m (5' 1\")   Wt 94.5 kg (208 lb 5.4 oz)   SpO2 92%   BMI 39.36 kg/m²     Temp (24hrs), Av.1 °F (36.7 °C), Min:97.9 °F (36.6 °C), Max:98.4 °F (36.9 °C)          O2 Device: None (Room air) O2 Flow Rate (L/min): 1 L/min     Wt Readings from Last 4 Encounters:   24 94.5 kg (208 lb 5.4 oz)   24 104.8 kg (231 lb 1.6 oz)          Intake/Output Summary (Last 24 hours) at 2024 0903  Last data filed at 2024 0434  Gross per 24 hour   Intake 237 ml   Output 1050 ml   Net -813 ml       Last shift:      No intake/output data recorded.  Last 3 shifts:  1901 -  0700  In: 237 [P.O.:237]  Out: 1850 [Urine:1850]       Physical Exam:     Physical Exam  Constitutional:       Appearance: She is obese.   HENT:      Head: Normocephalic and atraumatic.      Nose: Nose normal.      Mouth/Throat:      Mouth: Mucous membranes are moist.   Eyes:      Pupils: Pupils are equal, round, and reactive to light.   Cardiovascular:      Rate and Rhythm: Normal rate and regular rhythm.   Musculoskeletal:         General: Swelling present.      Cervical back: Normal range of motion and neck supple.      Right lower leg: Edema present.      Left lower leg: Edema present.   Neurological:      General: No focal deficit present.      Mental Status: She is alert.          Labs:    Recent Labs     24  0412 24  0712 24  0544   WBC 11.1* 10.8 11.3*   HGB 12.6 
34* 35* 33*   GLUCOSE 244* 238* 269*   BUN 23* 28* 27*   CREATININE 1.26* 1.16* 1.17*   CALCIUM 9.5 9.4 9.2   BILITOT 0.7 0.6 0.6   AST 20 26 19   ALT 18 22 20     No results for input(s): \"PH\", \"PCO2\", \"PO2\", \"HCO3\", \"FIO2\" in the last 72 hours.  Recent Labs     08/13/24  0620 08/14/24  0236 08/15/24  0605   PROBNP 2,683* 3,173* 2,309*     Lab Results   Component Value Date/Time    PROBNP 2,309 08/15/2024 06:05 AM      No results found for: \"TSH\"    Results       Procedure Component Value Units Date/Time    Culture, Urine [[id number]] Collected: 08/14/24 0620    Order Status: Sent Specimen: Urine Updated: 08/14/24 1220             Imaging:  CTA CHEST W WO CONTRAST PE Eval    Result Date: 8/12/2024  EXAM:  CTA CHEST W WO CONTRAST INDICATION: Chest pain with onset last night which worsened this morning and radiates to the right. COMPARISON: Chest x-ray 8/12/2024. TECHNIQUE: Helical thin section chest CT following intravenous administration of nonionic contrast 100 mL of isovue 370 according to departmental PE protocol. Coronal and sagittal reformats were performed. 3D post processing was performed.  CT dose reduction was achieved through the use of a standardized protocol tailored for this examination and automatic exposure control for dose modulation. FINDINGS: This is a good quality study for the evaluation of pulmonary embolism to the first subsegmental arterial level. There is no pulmonary embolism to this level. Pulmonary arterial pressure monitoring device is noted in the left main pulmonary artery at the bifurcation. MEDIASTINUM: No mass or lymphadenopathy. JOSELO: No mass or lymphadenopathy. THORACIC AORTA: No aneurysm. HEART: Normal in size without pericardial effusion. Coronary artery calcifications and pacemaker noted. ESOPHAGUS: No wall thickening or dilatation. TRACHEA/BRONCHI: Patent. PLEURA: Bilateral posteriorly layering pleural effusions greater on the left with no pneumothorax. LUNGS: Atelectasis 
Or    potassium bicarb-citric acid (EFFER-K) effervescent tablet 40 mEq  40 mEq Oral PRN    Or    potassium chloride 10 mEq/100 mL IVPB (Peripheral Line)  10 mEq IntraVENous PRN    magnesium sulfate 2000 mg in 50 mL IVPB premix  2,000 mg IntraVENous PRN    ondansetron (ZOFRAN-ODT) disintegrating tablet 4 mg  4 mg Oral Q8H PRN    Or    ondansetron (ZOFRAN) injection 4 mg  4 mg IntraVENous Q6H PRN    polyethylene glycol (GLYCOLAX) packet 17 g  17 g Oral Daily PRN    metoprolol tartrate (LOPRESSOR) tablet 50 mg  50 mg Oral BID    dilTIAZem (CARDIZEM CD) extended release capsule 300 mg  300 mg Oral Daily    ezetimibe (ZETIA) tablet 10 mg  10 mg Oral Nightly    potassium chloride (KLOR-CON) extended release tablet 10 mEq  10 mEq Oral Daily    atorvastatin (LIPITOR) tablet 40 mg  40 mg Oral Nightly    letrozole (FEMARA) tablet 2.5 mg  2.5 mg Oral Daily    digoxin (LANOXIN) tablet 125 mcg  125 mcg Oral Daily    acetaminophen (TYLENOL) tablet 1,000 mg  1,000 mg Oral 3 times per day    traMADol (ULTRAM) tablet 50 mg  50 mg Oral Q6H PRN    warfarin placeholder: dosing by pharmacy   Other RX Placeholder         Electronically signed by    TAMRA LUIS MD  August 19, 2024   8:55 AM    
morning and radiates to the right. COMPARISON: Chest x-ray 8/12/2024. TECHNIQUE: Helical thin section chest CT following intravenous administration of nonionic contrast 100 mL of isovue 370 according to departmental PE protocol. Coronal and sagittal reformats were performed. 3D post processing was performed.  CT dose reduction was achieved through the use of a standardized protocol tailored for this examination and automatic exposure control for dose modulation. FINDINGS: This is a good quality study for the evaluation of pulmonary embolism to the first subsegmental arterial level. There is no pulmonary embolism to this level. Pulmonary arterial pressure monitoring device is noted in the left main pulmonary artery at the bifurcation. MEDIASTINUM: No mass or lymphadenopathy. JOSELO: No mass or lymphadenopathy. THORACIC AORTA: No aneurysm. HEART: Normal in size without pericardial effusion. Coronary artery calcifications and pacemaker noted. ESOPHAGUS: No wall thickening or dilatation. TRACHEA/BRONCHI: Patent. PLEURA: Bilateral posteriorly layering pleural effusions greater on the left with no pneumothorax. LUNGS: Atelectasis overlies pleural effusions posteriorly. Otherwise clear. UPPER ABDOMEN: Partially imaged. No acute pathology. Cholecystectomy clips are noted. BONES: Degenerative spine change. No acute fracture or aggressive lesion. OTHER: Postsurgical changes noted in the left breast.     No pulmonary embolus. Bilateral pleural effusions, greater on the left. Electronically signed by Mayito Parish    XR CHEST PORTABLE    Result Date: 8/12/2024  PORTABLE CHEST RADIOGRAPH/S: 8/12/2024 3:55 AM INDICATION: Dyspnea. COMPARISON: None. TECHNIQUE: Portable frontal semiupright radiograph/s of the chest. FINDINGS: The heart is enlarged, even given portable technique. A pacemaker is in place. A small implanted monitoring device overlies the left pulmonary artery. There is pulmonary vascular congestion, interstitial edema, 
Bilateral pleural effusions, greater on the left. Electronically signed by Mayito Parish    XR CHEST PORTABLE    Result Date: 8/12/2024  PORTABLE CHEST RADIOGRAPH/S: 8/12/2024 3:55 AM INDICATION: Dyspnea. COMPARISON: None. TECHNIQUE: Portable frontal semiupright radiograph/s of the chest. FINDINGS: The heart is enlarged, even given portable technique. A pacemaker is in place. A small implanted monitoring device overlies the left pulmonary artery. There is pulmonary vascular congestion, interstitial edema, and probably a layering left pleural effusion. The central airways are patent. No pneumothorax. Surgical clips in the left breast and axilla.     CHF. Electronically signed by Justin Romero       ARTERIAL BLOOD GAS  No results for input(s): \"PHART\", \"ZRK9FFP\", \"PO2ART\", \"LCC0POX\", \"BEART\", \"JZX6ROBW\", \"HGBART\", \"UZ2HEUMYK\", \"FIO2A\", \"U7BQXOUY\", \"OXYHEM\", \"CARBOXHGBART\", \"METHGBART\", \"F3KHPQPIW\", \"PHCORART\", \"TEMP\" in the last 72 hours.    Invalid input(s): \"NSE0DOBNX\"  No results found for this or any previous visit.    IMPRESSION:   Acute on chronic respiratory failure with Hypoxia   Obstructive sleep apnea on oxygen on CPAP machine at home  History of left breast cancer  History of DVT of right lower extremity  Congestive heart failure  Pt is requiring Drug therapy requiring intensive monitoring for toxicity  Pt is unstable, unpredictable needing inpatient monitoring; is acutely ill and at high risk of sudden decline and decompensation with severe consequenses and continued end organ dysfunction and failure  Prognosis guarded       RECOMMENDATIONS/PLAN:     74-year-old lady came in because of shortness of breath leg swelling she is now on 3 L nasal Cannula oxygen as salvage oxygen delivery device to provide high concentration of oxygen to overcome refractory hypoxia;  She is uses 3 L oxygen at home at night with CPAP machine during the daytime she is on nasal cannula  CAT scan of the chest was done which 
Coronary artery calcifications and pacemaker noted. ESOPHAGUS: No wall thickening or dilatation. TRACHEA/BRONCHI: Patent. PLEURA: Bilateral posteriorly layering pleural effusions greater on the left with no pneumothorax. LUNGS: Atelectasis overlies pleural effusions posteriorly. Otherwise clear. UPPER ABDOMEN: Partially imaged. No acute pathology. Cholecystectomy clips are noted. BONES: Degenerative spine change. No acute fracture or aggressive lesion. OTHER: Postsurgical changes noted in the left breast.     No pulmonary embolus. Bilateral pleural effusions, greater on the left. Electronically signed by Mayito Parish    XR CHEST PORTABLE    Result Date: 8/12/2024  PORTABLE CHEST RADIOGRAPH/S: 8/12/2024 3:55 AM INDICATION: Dyspnea. COMPARISON: None. TECHNIQUE: Portable frontal semiupright radiograph/s of the chest. FINDINGS: The heart is enlarged, even given portable technique. A pacemaker is in place. A small implanted monitoring device overlies the left pulmonary artery. There is pulmonary vascular congestion, interstitial edema, and probably a layering left pleural effusion. The central airways are patent. No pneumothorax. Surgical clips in the left breast and axilla.     CHF. Electronically signed by Justin Romero       ARTERIAL BLOOD GAS  No results for input(s): \"PHART\", \"HVH0DGM\", \"PO2ART\", \"KMZ8NQL\", \"BEART\", \"BFI4KNSH\", \"HGBART\", \"UW8JSHCQF\", \"FIO2A\", \"K5UCSEHP\", \"OXYHEM\", \"CARBOXHGBART\", \"METHGBART\", \"G9XQNXJCQ\", \"PHCORART\", \"TEMP\" in the last 72 hours.    Invalid input(s): \"XHZ1SOXWU\"  No results found for this or any previous visit.    IMPRESSION:   Acute on chronic respiratory failure with Hypoxia   Obstructive sleep apnea on oxygen on CPAP machine at home  History of left breast cancer  History of DVT of right lower extremity  Congestive heart failure      RECOMMENDATIONS/PLAN:     74-year-old lady came in because of shortness of breath leg swelling she is now on 3 L nasal Cannula oxygen as salvage 
Coronary artery calcifications and pacemaker noted. ESOPHAGUS: No wall thickening or dilatation. TRACHEA/BRONCHI: Patent. PLEURA: Bilateral posteriorly layering pleural effusions greater on the left with no pneumothorax. LUNGS: Atelectasis overlies pleural effusions posteriorly. Otherwise clear. UPPER ABDOMEN: Partially imaged. No acute pathology. Cholecystectomy clips are noted. BONES: Degenerative spine change. No acute fracture or aggressive lesion. OTHER: Postsurgical changes noted in the left breast.     No pulmonary embolus. Bilateral pleural effusions, greater on the left. Electronically signed by Mayito Parish    XR CHEST PORTABLE    Result Date: 8/12/2024  PORTABLE CHEST RADIOGRAPH/S: 8/12/2024 3:55 AM INDICATION: Dyspnea. COMPARISON: None. TECHNIQUE: Portable frontal semiupright radiograph/s of the chest. FINDINGS: The heart is enlarged, even given portable technique. A pacemaker is in place. A small implanted monitoring device overlies the left pulmonary artery. There is pulmonary vascular congestion, interstitial edema, and probably a layering left pleural effusion. The central airways are patent. No pneumothorax. Surgical clips in the left breast and axilla.     CHF. Electronically signed by Justin Romero       ARTERIAL BLOOD GAS  No results for input(s): \"PHART\", \"LJM0XFF\", \"PO2ART\", \"UPB9UHY\", \"BEART\", \"FNR1HERS\", \"HGBART\", \"UQ2RGRAQK\", \"FIO2A\", \"J8AXYYNZ\", \"OXYHEM\", \"CARBOXHGBART\", \"METHGBART\", \"D6CBIESEF\", \"PHCORART\", \"TEMP\" in the last 72 hours.    Invalid input(s): \"YQK4DUEGF\"  No results found for this or any previous visit.    IMPRESSION:   Acute on chronic respiratory failure with Hypoxia   Obstructive sleep apnea on oxygen on CPAP machine at home  History of left breast cancer  History of DVT of right lower extremity  Congestive heart failure      RECOMMENDATIONS/PLAN:     74-year-old lady came in because of shortness of breath leg swelling she is now on 3 L nasal Cannula oxygen as salvage 
came in because of shortness of breath leg swelling she is now on 3 L nasal Cannula oxygen as salvage oxygen delivery device to provide high concentration of oxygen to overcome refractory hypoxia;  She is uses 3 L oxygen at home at night with CPAP machine during the daytime she is on nasal cannula  CAT scan of the chest was done which shows bilateral pleural effusion more on the left side  Continue with the Bumex 1 mg daily BNP 2395  Discussed with her to bring her home CPAP machine otherwise we will put hospital CPAP machine at night with Blend in oxygen 3 L nasal cannula       8/13 patient is alert and awake on 3 L nasal cannula.  Complains of shortness of breath upon movement.  Has chills.  Has CPAP machine at home.  She has a history of breast cancer and now has gotten radiation for.  Bilateral pleural effusions which is greater on the left.  Waiting for the echo.  But has a history of CHF  8/14 complaining of shortness of breath pressure in the chest.  Was on her CPAP machine throughout the night.  Her BMP went from 2000 to 3000.  On 3 L nasal cannula.  8/15 patient was alert and awake laying down with the CPAP machine. Patient claims she had an episode of shortness of breath around 1 AM last night.  Cannot sleep with the nasal cannula on so she took it off.  8/16 patient was alert and awake.  She looked better than yesterday.  No shortness of breath spells.   no chest pain complaints.  She is chronically on home CPAP compliant with it  8/17 patient is alert and awake.  Due to her oxygen being 99% she she got taken off supplementary oxygen.  She now uses her CPAP machine whenever she needs to.  No shortness of breath no chills no nausea    Stu Lynn      
breath no nausea    Gurgen Grigoryants      
      ASSESSMENT & PLAN:      Acute congestive heart failure with active diastolic dysfunction  Acute hypoxic respiratory failure likely from CHF pleural effusion  Type 2 diabetes uncontrolled  Bilateral pleural effusions  History of chronic A-fib with pacemaker on Coumadin  Hypertension\history of breast cancer right mastectomy status postradiation\  obstructive sleep apnea on CPAP  Right knee pain/   UTI  Hypotension    Recent left ankle fracture status postsurgery    CT chest without contrast performed (8/12) - no pulmonary embolus.  Bilateral pleural effusions, greater on the left    EKG demonstrates ventricular paced rhythm    X-ray of the right knee negative      Repeat the labs in the am    Consulted cardiology 8/16- No further cardiovascular testing based on current clinical assessment and we will follow while patient is in the hospital along with the team on as needed basis from now onward.     Increase NPH to 24 units subcu twice a day  Replace potassium  Continue IV Rocephin for UTI  Change Bumex to p.o.    Consulted pulmonology 8/18-having good oxygen no need for supplemental oxygen.  She has a CPAP machine at home    Possible discharge home in next 2448 hrs.          Current Facility-Administered Medications:     bumetanide (BUMEX) tablet 1 mg, 1 mg, Oral, Daily, Gilda Foote MD, 1 mg at 08/18/24 1238    insulin NPH (HumuLIN N;NovoLIN N) injection vial 24 Units, 24 Units, SubCUTAneous, BID AC, Gilda Foote MD, 24 Units at 08/19/24 0631    cefTRIAXone (ROCEPHIN) 1,000 mg in sterile water 10 mL IV syringe, 1,000 mg, IntraVENous, Q24H, Gilda Foote MD, 1,000 mg at 08/18/24 2128    latanoprost (XALATAN) 0.005 % ophthalmic solution 1 drop, 1 drop, Both Eyes, Nightly, Gilda Foote MD, 1 drop at 08/18/24 2140    diphenhydrAMINE (BENADRYL) capsule 50 mg, 50 mg, Oral, Q6H PRN, Gilda Foote MD    hydrocortisone 1 % cream, , Topical, TID, Gilda Foote MD, Given at 08/16/24 1721    
assessment and we will follow while patient is in the hospital along with the team on as needed basis from now onward.     Increase NPH to 24 units subcu twice a day  Replace potassium  Continue IV Rocephin for UTI              Current Facility-Administered Medications:     insulin NPH (HumuLIN N;NovoLIN N) injection vial 24 Units, 24 Units, SubCUTAneous, BID AC, Gilda Foote MD, 24 Units at 08/18/24 0622    cefTRIAXone (ROCEPHIN) 1,000 mg in sterile water 10 mL IV syringe, 1,000 mg, IntraVENous, Q24H, Gilda Foote MD, 1,000 mg at 08/17/24 2113    bumetanide (BUMEX) injection 1 mg, 1 mg, IntraVENous, BID, Gilda Foote MD, 1 mg at 08/17/24 1651    latanoprost (XALATAN) 0.005 % ophthalmic solution 1 drop, 1 drop, Both Eyes, Nightly, Gilda Foote MD, 1 drop at 08/17/24 2005    diphenhydrAMINE (BENADRYL) capsule 50 mg, 50 mg, Oral, Q6H PRN, Gilda Foote MD    hydrocortisone 1 % cream, , Topical, TID, Gilda Foote MD, Given at 08/16/24 1721    insulin lispro (HUMALOG,ADMELOG) injection vial 0-16 Units, 0-16 Units, SubCUTAneous, TID WC, Gilda Foote MD, 16 Units at 08/17/24 1651    insulin lispro (HUMALOG,ADMELOG) injection vial 0-4 Units, 0-4 Units, SubCUTAneous, Nightly, Gilda Foote MD, 4 Units at 08/17/24 2322    glucose chewable tablet 16 g, 4 tablet, Oral, PRN, Gilda Foote MD    dextrose bolus 10% 125 mL, 125 mL, IntraVENous, PRN **OR** dextrose bolus 10% 250 mL, 250 mL, IntraVENous, PRN, Gilda Foote MD    glucagon injection 1 mg, 1 mg, SubCUTAneous, PRN, Gilda Foote MD    dextrose 10 % infusion, , IntraVENous, Continuous PRN, Gilda Foote MD    sodium chloride flush 0.9 % injection 5-40 mL, 5-40 mL, IntraVENous, 2 times per day, Gilda Foote MD, 10 mL at 08/17/24 2005    sodium chloride flush 0.9 % injection 5-40 mL, 5-40 mL, IntraVENous, PRN, Gilda Foote MD, 10 mL at 08/15/24 1822    0.9 % sodium chloride infusion, , IntraVENous, PRN, 
Dina Arellano PA-C    warfarin placeholder: dosing by pharmacy, , Other, RX Placeholder, Gilda Foote MD         _________  _______________________________________________________________    Signed: Gilda Foote MD   
Gilda Foote MD    glucagon injection 1 mg, 1 mg, SubCUTAneous, PRN, Gilda Foote MD    dextrose 10 % infusion, , IntraVENous, Continuous PRN, Gilda Foote MD    sodium chloride flush 0.9 % injection 5-40 mL, 5-40 mL, IntraVENous, 2 times per day, Gilda Foote MD, 10 mL at 08/17/24 0927    sodium chloride flush 0.9 % injection 5-40 mL, 5-40 mL, IntraVENous, PRN, Gilda Foote MD, 10 mL at 08/15/24 1822    0.9 % sodium chloride infusion, , IntraVENous, PRN, Gilda Foote MD    potassium chloride (KLOR-CON M) extended release tablet 40 mEq, 40 mEq, Oral, PRN **OR** potassium bicarb-citric acid (EFFER-K) effervescent tablet 40 mEq, 40 mEq, Oral, PRN **OR** potassium chloride 10 mEq/100 mL IVPB (Peripheral Line), 10 mEq, IntraVENous, PRN, Gilda Foote MD    magnesium sulfate 2000 mg in 50 mL IVPB premix, 2,000 mg, IntraVENous, PRN, Gilda Foote MD    ondansetron (ZOFRAN-ODT) disintegrating tablet 4 mg, 4 mg, Oral, Q8H PRN **OR** ondansetron (ZOFRAN) injection 4 mg, 4 mg, IntraVENous, Q6H PRN, Gilda Foote MD    polyethylene glycol (GLYCOLAX) packet 17 g, 17 g, Oral, Daily PRN, Gilda Foote MD    metoprolol tartrate (LOPRESSOR) tablet 50 mg, 50 mg, Oral, BID, Gilda Foote MD, 50 mg at 08/17/24 0923    dilTIAZem (CARDIZEM CD) extended release capsule 300 mg, 300 mg, Oral, Daily, Gilda Foote MD, 300 mg at 08/17/24 0923    ezetimibe (ZETIA) tablet 10 mg, 10 mg, Oral, Nightly, Gilda Foote MD, 10 mg at 08/16/24 2106    potassium chloride (KLOR-CON) extended release tablet 10 mEq, 10 mEq, Oral, Daily, Gilda Foote MD, 10 mEq at 08/17/24 0923    atorvastatin (LIPITOR) tablet 40 mg, 40 mg, Oral, Nightly, Gilda Foote MD, 40 mg at 08/16/24 2107    insulin NPH (HumuLIN N;NovoLIN N) injection vial 24 Units, 24 Units, SubCUTAneous, QAM, Gilda Foote MD, 24 Units at 08/17/24 0923    letrozole (FEMARA) tablet 2.5 mg, 2.5 mg, Oral, Daily, Gilda Foote, 
Oral, Daily PRN, Gilda Foote MD    metoprolol tartrate (LOPRESSOR) tablet 50 mg, 50 mg, Oral, BID, Gilda Foote MD, 50 mg at 08/16/24 0910    dilTIAZem (CARDIZEM CD) extended release capsule 300 mg, 300 mg, Oral, Daily, Gilda Foote MD, 300 mg at 08/16/24 0910    ezetimibe (ZETIA) tablet 10 mg, 10 mg, Oral, Nightly, Gilda Foote MD, 10 mg at 08/15/24 2047    potassium chloride (KLOR-CON) extended release tablet 10 mEq, 10 mEq, Oral, Daily, Gilda Foote MD, 10 mEq at 08/16/24 0910    atorvastatin (LIPITOR) tablet 40 mg, 40 mg, Oral, Nightly, Gilda Foote MD, 40 mg at 08/15/24 2047    insulin NPH (HumuLIN N;NovoLIN N) injection vial 24 Units, 24 Units, SubCUTAneous, QAM, Gilda Foote MD, 24 Units at 08/16/24 0911    letrozole (FEMARA) tablet 2.5 mg, 2.5 mg, Oral, Daily, Gilda Foote MD, 2.5 mg at 08/16/24 0910    digoxin (LANOXIN) tablet 125 mcg, 125 mcg, Oral, Daily, Gilda Foote MD, 125 mcg at 08/16/24 0910    acetaminophen (TYLENOL) tablet 1,000 mg, 1,000 mg, Oral, 3 times per day, Dina Arellano PA-C, 1,000 mg at 08/14/24 2054    traMADol (ULTRAM) tablet 50 mg, 50 mg, Oral, Q6H PRN, Dina Arellano PA-C    warfarin placeholder: dosing by pharmacy, , Other, RX Placeholder, Gilda Foote MD         _________  _______________________________________________________________    Signed: Gilda Foote MD

## 2025-02-14 ENCOUNTER — HOSPITAL ENCOUNTER (EMERGENCY)
Facility: HOSPITAL | Age: 75
Discharge: HOME | End: 2025-02-14
Attending: EMERGENCY MEDICINE
Payer: OTHER GOVERNMENT

## 2025-02-14 ENCOUNTER — APPOINTMENT (OUTPATIENT)
Facility: HOSPITAL | Age: 75
End: 2025-02-14
Attending: EMERGENCY MEDICINE
Payer: OTHER GOVERNMENT

## 2025-02-14 VITALS
WEIGHT: 210.8 LBS | TEMPERATURE: 97.9 F | SYSTOLIC BLOOD PRESSURE: 111 MMHG | HEIGHT: 61 IN | BODY MASS INDEX: 39.8 KG/M2 | OXYGEN SATURATION: 93 % | HEART RATE: 70 BPM | DIASTOLIC BLOOD PRESSURE: 46 MMHG | RESPIRATION RATE: 20 BRPM

## 2025-02-14 DIAGNOSIS — I50.9 ACUTE ON CHRONIC CONGESTIVE HEART FAILURE, UNSPECIFIED HEART FAILURE TYPE (HCC): ICD-10-CM

## 2025-02-14 DIAGNOSIS — R07.9 CHEST PAIN, UNSPECIFIED TYPE: Primary | ICD-10-CM

## 2025-02-14 LAB
ANION GAP SERPL CALC-SCNC: 7 MMOL/L (ref 2–12)
BASOPHILS # BLD: 0.11 K/UL (ref 0–0.1)
BASOPHILS NFR BLD: 0.8 % (ref 0–1)
BNP SERPL-MCNC: 2878 PG/ML
BUN SERPL-MCNC: 28 MG/DL (ref 6–20)
BUN/CREAT SERPL: 19 (ref 12–20)
CA-I BLD-MCNC: 9 MG/DL (ref 8.5–10.1)
CHLORIDE SERPL-SCNC: 98 MMOL/L (ref 97–108)
CO2 SERPL-SCNC: 31 MMOL/L (ref 21–32)
CREAT SERPL-MCNC: 1.44 MG/DL (ref 0.55–1.02)
DIFFERENTIAL METHOD BLD: ABNORMAL
EOSINOPHIL # BLD: 0.54 K/UL (ref 0–0.4)
EOSINOPHIL NFR BLD: 3.9 % (ref 0–7)
ERYTHROCYTE [DISTWIDTH] IN BLOOD BY AUTOMATED COUNT: 16.8 % (ref 11.5–14.5)
GLUCOSE SERPL-MCNC: 279 MG/DL (ref 65–100)
HCT VFR BLD AUTO: 39.6 % (ref 35–47)
HGB BLD-MCNC: 13.2 G/DL (ref 11.5–16)
IMM GRANULOCYTES # BLD AUTO: 0.26 K/UL (ref 0–0.04)
IMM GRANULOCYTES NFR BLD AUTO: 1.9 % (ref 0–0.5)
LYMPHOCYTES # BLD: 1.48 K/UL (ref 0.8–3.5)
LYMPHOCYTES NFR BLD: 10.6 % (ref 12–49)
MCH RBC QN AUTO: 29.4 PG (ref 26–34)
MCHC RBC AUTO-ENTMCNC: 33.3 G/DL (ref 30–36.5)
MCV RBC AUTO: 88.2 FL (ref 80–99)
MONOCYTES # BLD: 0.62 K/UL (ref 0–1)
MONOCYTES NFR BLD: 4.4 % (ref 5–13)
NEUTS SEG # BLD: 10.95 K/UL (ref 1.8–8)
NEUTS SEG NFR BLD: 78.4 % (ref 32–75)
NRBC # BLD: 0 K/UL (ref 0–0.01)
NRBC BLD-RTO: 0 PER 100 WBC
PLATELET # BLD AUTO: 462 K/UL (ref 150–400)
PMV BLD AUTO: 9.2 FL (ref 8.9–12.9)
POTASSIUM SERPL-SCNC: 4.2 MMOL/L (ref 3.5–5.1)
RBC # BLD AUTO: 4.49 M/UL (ref 3.8–5.2)
SODIUM SERPL-SCNC: 136 MMOL/L (ref 136–145)
TROPONIN I SERPL HS-MCNC: 12 NG/L (ref 0–51)
TROPONIN I SERPL HS-MCNC: 8 NG/L (ref 0–51)
WBC # BLD AUTO: 14 K/UL (ref 3.6–11)

## 2025-02-14 PROCEDURE — 93005 ELECTROCARDIOGRAM TRACING: CPT | Performed by: EMERGENCY MEDICINE

## 2025-02-14 PROCEDURE — 83880 ASSAY OF NATRIURETIC PEPTIDE: CPT

## 2025-02-14 PROCEDURE — 71275 CT ANGIOGRAPHY CHEST: CPT

## 2025-02-14 PROCEDURE — 84484 ASSAY OF TROPONIN QUANT: CPT

## 2025-02-14 PROCEDURE — 36415 COLL VENOUS BLD VENIPUNCTURE: CPT

## 2025-02-14 PROCEDURE — 6360000002 HC RX W HCPCS: Performed by: EMERGENCY MEDICINE

## 2025-02-14 PROCEDURE — 99285 EMERGENCY DEPT VISIT HI MDM: CPT

## 2025-02-14 PROCEDURE — 6370000000 HC RX 637 (ALT 250 FOR IP): Performed by: EMERGENCY MEDICINE

## 2025-02-14 PROCEDURE — 71045 X-RAY EXAM CHEST 1 VIEW: CPT

## 2025-02-14 PROCEDURE — 80048 BASIC METABOLIC PNL TOTAL CA: CPT

## 2025-02-14 PROCEDURE — 6360000004 HC RX CONTRAST MEDICATION: Performed by: EMERGENCY MEDICINE

## 2025-02-14 PROCEDURE — 85025 COMPLETE CBC W/AUTO DIFF WBC: CPT

## 2025-02-14 RX ORDER — ASPIRIN 325 MG
325 TABLET ORAL
Status: COMPLETED | OUTPATIENT
Start: 2025-02-14 | End: 2025-02-14

## 2025-02-14 RX ORDER — FUROSEMIDE 10 MG/ML
80 INJECTION INTRAMUSCULAR; INTRAVENOUS
Status: DISCONTINUED | OUTPATIENT
Start: 2025-02-14 | End: 2025-02-15 | Stop reason: HOSPADM

## 2025-02-14 RX ORDER — IOPAMIDOL 755 MG/ML
100 INJECTION, SOLUTION INTRAVASCULAR
Status: COMPLETED | OUTPATIENT
Start: 2025-02-14 | End: 2025-02-14

## 2025-02-14 RX ORDER — BUMETANIDE 1 MG/1
1 TABLET ORAL 2 TIMES DAILY
Qty: 10 TABLET | Refills: 0 | Status: SHIPPED | OUTPATIENT
Start: 2025-02-14 | End: 2025-02-19

## 2025-02-14 RX ADMIN — IOPAMIDOL 100 ML: 755 INJECTION, SOLUTION INTRAVENOUS at 18:58

## 2025-02-14 RX ADMIN — ASPIRIN 325 MG: 325 TABLET ORAL at 17:30

## 2025-02-14 ASSESSMENT — LIFESTYLE VARIABLES
HOW MANY STANDARD DRINKS CONTAINING ALCOHOL DO YOU HAVE ON A TYPICAL DAY: PATIENT DOES NOT DRINK
HOW OFTEN DO YOU HAVE A DRINK CONTAINING ALCOHOL: NEVER

## 2025-02-14 ASSESSMENT — PAIN DESCRIPTION - LOCATION: LOCATION: CHEST

## 2025-02-14 ASSESSMENT — PAIN SCALES - GENERAL: PAINLEVEL_OUTOF10: 8

## 2025-02-14 ASSESSMENT — HEART SCORE: ECG: NON-SPECIFC REPOLARIZATION DISTURBANCE/LBTB/PM

## 2025-02-14 ASSESSMENT — PAIN DESCRIPTION - DESCRIPTORS: DESCRIPTORS: ACHING

## 2025-02-14 ASSESSMENT — PAIN - FUNCTIONAL ASSESSMENT
PAIN_FUNCTIONAL_ASSESSMENT: ACTIVITIES ARE NOT PREVENTED
PAIN_FUNCTIONAL_ASSESSMENT: 0-10

## 2025-02-14 NOTE — ED TRIAGE NOTES
Patient presents for complaint of chest pain since yesterday.  Pain has gotten progressively worse.

## 2025-02-14 NOTE — ED PROVIDER NOTES
02/14/25  5:30 PM   Result Value Ref Range    Troponin, High Sensitivity 12 0 - 51 ng/L   Brain Natriuretic Peptide    Collection Time: 02/14/25  5:30 PM   Result Value Ref Range    NT Pro-BNP 2,878 (H) <450 pg/mL   Troponin    Collection Time: 02/14/25  6:39 PM   Result Value Ref Range    Troponin, High Sensitivity 8 0 - 51 ng/L         EKG interpretation by me: Ventricular-paced@70bpm, , nl axis, ~1-2mm JU V2 but no other JU or STD, diffuse TWI c/w LVH      Radiologic Studies:  Non-plain film images such as CT, Ultrasound and MRI are read by the radiologist. Plain radiographic images are visualized and preliminarily interpreted by the ED Provider with the following findings: (SEE ED COURSE)    Interpretation per the Radiologist below, if available at the time of this note:  CTA CHEST W WO CONTRAST PE Eval   Final Result   1.  No evidence of pulmonary embolism.   2.  Small left-sided pleural effusion.         Electronically signed by Naytev      XR CHEST PORTABLE   Final Result   1. Enlarged cardiac silhouette, otherwise no acute disease         Electronically signed by Morgan Cleveland                       ED COURSE and DIFFERENTIAL DIAGNOSIS/MDM         5:12 PM Differential and Considerations: 74F w/chest pain, r/o ACS. Already on warfarin for DVT, unlikely PE, but will get CTA. Aspirin for now. CXR to eval for PNA/PTX. Dispo pending workup.        Records Reviewed (source and summary of external notes): Prior medical records and Nursing notes.    Vitals:    Vitals:    02/14/25 1708   BP: (!) 116/56   Pulse: 70   Resp: 20   Temp: 97.9 °F (36.6 °C)   TempSrc: Oral   SpO2: 91%   Weight: 95.6 kg (210 lb 12.8 oz)   Height: 1.549 m (5' 1\")        ED COURSE  ED Course as of 02/14/25 1936 Fri Feb 14, 2025   1827 NT Pro-BNP(!): 2,878  Slightly up, will give lasix IV. [YA]   1827 Troponin, High Sensitivity: 12  Reassuring, will repeat. [YA]   1911 Troponin, High Sensitivity: 8  Stable, reassuring. [YA]   1911  XR CHEST PORTABLE    IMPRESSION:  1. Enlarged cardiac silhouette, otherwise no acute disease        Electronically signed by Morgan Cleveland        Exam Ended: 25 18:23 EST Last Resulted: 25 18:27 EST   [YA]   1913 Patient refusing lasix. [YA]   1917 CTA CHEST W WO CONTRAST PE Eval    IMPRESSION:  1.  No evidence of pulmonary embolism.  2.  Small left-sided pleural effusion.        Electronically signed by BYRON Walker        Exam Ended: 25 18:56 EST Last Resulted: 25 19:14 EST   [YA]   1921 Will discharge with return precautions. [YA]   1935 Updated patient on results. States some nurses give her Bumex BID and others once daily, thinks that is cause of her symptoms. Will make sure she is taking it BID for next 5d and f/up with her cardiologists. [YA]      ED Course User Index  [YA] Amy Perez MD     History: 1  EC  Patient Age: 1  Risk Factors: 2  Troponin: 0  Heart Score Total: 6        Patient was given the following medications:  Medications   furosemide (LASIX) injection 80 mg (0 mg IntraVENous Held 25 1909)   aspirin tablet 325 mg (325 mg Oral Given 25 1730)   iopamidol (ISOVUE-370) 76 % injection 100 mL (100 mLs IntraVENous Given 25 1858)       CONSULTS: See ED Course/MDM for further details.         PROCEDURES   Unless otherwise noted above, none      CRITICAL CARE TIME   N/a     ED IMPRESSION     1. Chest pain, unspecified type    2. Acute on chronic congestive heart failure, unspecified heart failure type (HCC)          DISPOSITION/PLAN   Discharge     PATIENT REFERRED TO:  Gilda Foote MD  [facility]   [facility] [address] [phone]    In 3 days      [address] [address] [phone]            DISCHARGE MEDICATIONS:     Medication List        CHANGE how you take these medications      * bumetanide 1 MG tablet  Commonly known as: BUMEX  Take 1 tablet by mouth daily  What changed:

## 2025-02-15 NOTE — DISCHARGE INSTRUCTIONS
You were seen in the ER for your chest pain.  Thankfully, we were able to rule out a heart attack or blood clot in your lungs.  This is likely from your heart failure which is an exacerbation.  You did not want us to give you a diuretic called Lasix to make you pee off this fluid while you were here in the ER.  You should take twice the dose of your Bumex for the next 5 days.  Take it twice a day instead of once a day.  If you change your mind about getting IV Lasix, or having worsening chest pain or difficulty breathing or any other new or concerning symptoms, please return to the emergency room immediately.          Thank you for choosing our Emergency Department for your care.  It is our privilege to care for you in your time of need.  In the next several days, you may receive a survey via email or mailed to your home about your experience with our team.  We would greatly appreciate you taking a few minutes to complete the survey, as we use this information to learn what we have done well and what we could be doing better. Thank you for trusting us with your care!    Below you will find a list of your tests from today's visit.   Labs and Radiology Studies  Recent Results (from the past 12 hour(s))   Basic Metabolic Panel    Collection Time: 02/14/25  5:30 PM   Result Value Ref Range    Sodium 136 136 - 145 mmol/L    Potassium 4.2 3.5 - 5.1 mmol/L    Chloride 98 97 - 108 mmol/L    CO2 31 21 - 32 mmol/L    Anion Gap 7 2 - 12 mmol/L    Glucose 279 (H) 65 - 100 mg/dL    BUN 28 (H) 6 - 20 mg/dL    Creatinine 1.44 (H) 0.55 - 1.02 mg/dL    BUN/Creatinine Ratio 19 12 - 20      Est, Glom Filt Rate 38 (L) >60 ml/min/1.73m2    Calcium 9.0 8.5 - 10.1 mg/dL   CBC with Auto Differential    Collection Time: 02/14/25  5:30 PM   Result Value Ref Range    WBC 14.0 (H) 3.6 - 11.0 K/uL    RBC 4.49 3.80 - 5.20 M/uL    Hemoglobin 13.2 11.5 - 16.0 g/dL    Hematocrit 39.6 35.0 - 47.0 %    MCV 88.2 80.0 - 99.0 FL    MCH 29.4 26.0 - 34.0

## 2025-02-18 LAB
EKG ATRIAL RATE: 0 BPM
EKG DIAGNOSIS: NORMAL
EKG P AXIS: 0 DEGREES
EKG P-R INTERVAL: 186 MS
EKG Q-T INTERVAL: 435 MS
EKG QRS DURATION: 136 MS
EKG QTC CALCULATION (BAZETT): 470 MS
EKG R AXIS: 46 DEGREES
EKG T AXIS: 217 DEGREES
EKG VENTRICULAR RATE: 70 BPM

## 2025-05-04 ENCOUNTER — APPOINTMENT (OUTPATIENT)
Facility: HOSPITAL | Age: 75
End: 2025-05-04
Attending: EMERGENCY MEDICINE
Payer: OTHER GOVERNMENT

## 2025-05-04 ENCOUNTER — HOSPITAL ENCOUNTER (EMERGENCY)
Facility: HOSPITAL | Age: 75
Discharge: HOME | End: 2025-05-05
Attending: EMERGENCY MEDICINE
Payer: OTHER GOVERNMENT

## 2025-05-04 DIAGNOSIS — R07.1 CHEST PAIN ON BREATHING: Primary | ICD-10-CM

## 2025-05-04 LAB
ALBUMIN SERPL-MCNC: 2.4 G/DL (ref 3.5–5)
ALBUMIN/GLOB SERPL: 0.6 (ref 1.1–2.2)
ALP SERPL-CCNC: 142 U/L (ref 45–117)
ALT SERPL-CCNC: 15 U/L (ref 12–78)
ANION GAP SERPL CALC-SCNC: 5 MMOL/L (ref 2–12)
AST SERPL W P-5'-P-CCNC: 28 U/L (ref 15–37)
BASOPHILS # BLD: 0.1 K/UL (ref 0–0.1)
BASOPHILS NFR BLD: 0.8 % (ref 0–1)
BILIRUB SERPL-MCNC: 0.6 MG/DL (ref 0.2–1)
BNP SERPL-MCNC: 2810 PG/ML
BUN SERPL-MCNC: 34 MG/DL (ref 6–20)
BUN/CREAT SERPL: 23 (ref 12–20)
CA-I BLD-MCNC: 9.4 MG/DL (ref 8.5–10.1)
CHLORIDE SERPL-SCNC: 98 MMOL/L (ref 97–108)
CO2 SERPL-SCNC: 34 MMOL/L (ref 21–32)
CREAT SERPL-MCNC: 1.5 MG/DL (ref 0.55–1.02)
DIFFERENTIAL METHOD BLD: ABNORMAL
EOSINOPHIL # BLD: 0.38 K/UL (ref 0–0.4)
EOSINOPHIL NFR BLD: 3 % (ref 0–7)
ERYTHROCYTE [DISTWIDTH] IN BLOOD BY AUTOMATED COUNT: 15.9 % (ref 11.5–14.5)
GLOBULIN SER CALC-MCNC: 4 G/DL (ref 2–4)
GLUCOSE SERPL-MCNC: 260 MG/DL (ref 65–100)
HCT VFR BLD AUTO: 44.1 % (ref 35–47)
HGB BLD-MCNC: 14.7 G/DL (ref 11.5–16)
IMM GRANULOCYTES # BLD AUTO: 0.14 K/UL (ref 0–0.04)
IMM GRANULOCYTES NFR BLD AUTO: 1.1 % (ref 0–0.5)
INR PPP: 2.8 (ref 0.9–1.1)
LYMPHOCYTES # BLD: 0.86 K/UL (ref 0.8–3.5)
LYMPHOCYTES NFR BLD: 6.8 % (ref 12–49)
MCH RBC QN AUTO: 29.1 PG (ref 26–34)
MCHC RBC AUTO-ENTMCNC: 33.3 G/DL (ref 30–36.5)
MCV RBC AUTO: 87.2 FL (ref 80–99)
MONOCYTES # BLD: 0.73 K/UL (ref 0–1)
MONOCYTES NFR BLD: 5.7 % (ref 5–13)
NEUTS SEG # BLD: 10.5 K/UL (ref 1.8–8)
NEUTS SEG NFR BLD: 82.6 % (ref 32–75)
NRBC # BLD: 0 K/UL (ref 0–0.01)
NRBC BLD-RTO: 0 PER 100 WBC
PLATELET # BLD AUTO: 387 K/UL (ref 150–400)
PMV BLD AUTO: 8.9 FL (ref 8.9–12.9)
POTASSIUM SERPL-SCNC: 4.3 MMOL/L (ref 3.5–5.1)
PROT SERPL-MCNC: 6.4 G/DL (ref 6.4–8.2)
PROTHROMBIN TIME: 29.5 SEC (ref 11.9–14.6)
RBC # BLD AUTO: 5.06 M/UL (ref 3.8–5.2)
SODIUM SERPL-SCNC: 137 MMOL/L (ref 136–145)
TROPONIN I SERPL HS-MCNC: 11 NG/L (ref 0–51)
TROPONIN I SERPL HS-MCNC: 12 NG/L (ref 0–51)
WBC # BLD AUTO: 12.7 K/UL (ref 3.6–11)

## 2025-05-04 PROCEDURE — 93005 ELECTROCARDIOGRAM TRACING: CPT | Performed by: EMERGENCY MEDICINE

## 2025-05-04 PROCEDURE — 83880 ASSAY OF NATRIURETIC PEPTIDE: CPT

## 2025-05-04 PROCEDURE — 70450 CT HEAD/BRAIN W/O DYE: CPT

## 2025-05-04 PROCEDURE — 71045 X-RAY EXAM CHEST 1 VIEW: CPT

## 2025-05-04 PROCEDURE — 85025 COMPLETE CBC W/AUTO DIFF WBC: CPT

## 2025-05-04 PROCEDURE — 80053 COMPREHEN METABOLIC PANEL: CPT

## 2025-05-04 PROCEDURE — 6360000004 HC RX CONTRAST MEDICATION: Performed by: EMERGENCY MEDICINE

## 2025-05-04 PROCEDURE — 84484 ASSAY OF TROPONIN QUANT: CPT

## 2025-05-04 PROCEDURE — 36415 COLL VENOUS BLD VENIPUNCTURE: CPT

## 2025-05-04 PROCEDURE — 99285 EMERGENCY DEPT VISIT HI MDM: CPT

## 2025-05-04 PROCEDURE — 71275 CT ANGIOGRAPHY CHEST: CPT

## 2025-05-04 PROCEDURE — 85610 PROTHROMBIN TIME: CPT

## 2025-05-04 RX ORDER — IOPAMIDOL 755 MG/ML
100 INJECTION, SOLUTION INTRAVASCULAR
Status: COMPLETED | OUTPATIENT
Start: 2025-05-04 | End: 2025-05-04

## 2025-05-04 RX ADMIN — IOPAMIDOL 100 ML: 755 INJECTION, SOLUTION INTRAVENOUS at 21:09

## 2025-05-04 ASSESSMENT — PAIN DESCRIPTION - LOCATION: LOCATION: CHEST

## 2025-05-04 ASSESSMENT — PAIN SCALES - GENERAL: PAINLEVEL_OUTOF10: 4

## 2025-05-04 ASSESSMENT — PAIN - FUNCTIONAL ASSESSMENT
PAIN_FUNCTIONAL_ASSESSMENT: 0-10
PAIN_FUNCTIONAL_ASSESSMENT: ACTIVITIES ARE NOT PREVENTED

## 2025-05-04 ASSESSMENT — PAIN DESCRIPTION - DESCRIPTORS: DESCRIPTORS: BURNING;TIGHTNESS

## 2025-05-04 ASSESSMENT — PAIN DESCRIPTION - PAIN TYPE: TYPE: ACUTE PAIN

## 2025-05-04 NOTE — ED PROVIDER NOTES
workup.        Records Reviewed (source and summary of external notes): Prior medical records and Nursing notes.    Vitals:    Vitals:    05/04/25 1840 05/04/25 2005   BP: (!) 125/58 102/62   Pulse: 70 70   Resp: 20 23   Temp: 98.2 °F (36.8 °C)    TempSrc: Oral    SpO2: 92% 93%   Weight: 93.6 kg (206 lb 4.8 oz)    Height: 1.549 m (5' 1\")         ED COURSE  ED Course as of 05/04/25 2035   Sun May 04, 2025   1947 INR(!): 2.8  In range [YA]   1947 XR CHEST PORTABLE    FINDINGS:  AP portable upright view of the chest demonstrates a stable enlarged  cardiopericardial silhouette.There is no pleural effusion.There is no focal  consolidation.There is no pneumothorax. Patient is on a cardiac monitor.     IMPRESSION:  No acute intrathoracic process is identified.            Electronically signed by ROSY DURAND      Exam Ended: 05/04/25 19:15 EDT Last Resulted: 05/04/25 19:31 EDT [YA]   2015 Signed out to Dr. Gianni Mccartney. [YA]      ED Course User Index  [YA] Amy Perez MD     Patient was given the following medications:  Medications - No data to display    CONSULTS: See ED Course/MDM for further details.         PROCEDURES   Unless otherwise noted above, none      CRITICAL CARE TIME        ED IMPRESSION     1. Chest pain on breathing          DISPOSITION/PLAN   Per Dr. Mccartney     PATIENT REFERRED TO:  No follow-up provider specified.      DISCHARGE MEDICATIONS:     Medication List        ASK your doctor about these medications      acetaminophen 500 MG tablet  Commonly known as: TYLENOL     atorvastatin 80 MG tablet  Commonly known as: LIPITOR     * bumetanide 1 MG tablet  Commonly known as: BUMEX  Take 1 tablet by mouth daily     * bumetanide 1 MG tablet  Commonly known as: Bumex  Take 1 tablet by mouth 2 times daily for 5 days     digoxin 125 MCG tablet  Commonly known as: LANOXIN     dilTIAZem HCl  MG Tb24     ezetimibe 10 MG tablet  Commonly known as: ZETIA     glucose 4 g chewable tablet     HumaLOG 100 UNIT/ML Soln

## 2025-05-04 NOTE — ED TRIAGE NOTES
Patient presents via [facility] ambulance from [facility] re[facility].  Presents for complaint of substernal chest pain since 1300 today which she describes as burning and tightening.  Pain 4/10.

## 2025-05-05 VITALS
DIASTOLIC BLOOD PRESSURE: 55 MMHG | SYSTOLIC BLOOD PRESSURE: 102 MMHG | BODY MASS INDEX: 38.95 KG/M2 | WEIGHT: 206.3 LBS | HEIGHT: 61 IN | RESPIRATION RATE: 21 BRPM | OXYGEN SATURATION: 95 % | HEART RATE: 70 BPM | TEMPERATURE: 98.2 F

## 2025-05-05 LAB
EKG ATRIAL RATE: 0 BPM
EKG DIAGNOSIS: NORMAL
EKG P AXIS: 0 DEGREES
EKG P-R INTERVAL: 252 MS
EKG Q-T INTERVAL: 388 MS
EKG QRS DURATION: 136 MS
EKG QTC CALCULATION (BAZETT): 419 MS
EKG R AXIS: 113 DEGREES
EKG T AXIS: 40 DEGREES
EKG VENTRICULAR RATE: 70 BPM

## 2025-05-05 NOTE — ED NOTES
[facility]

## 2025-05-05 NOTE — ED NOTES
Patient care transfer note:    Patient care transferred at shift change by Dr. Lion.  Handover instruction to check CTA; if negative patient can be discharged.         , Gianni ELLIOTT MD  05/04/25 8788